# Patient Record
Sex: FEMALE | Race: WHITE | NOT HISPANIC OR LATINO | URBAN - METROPOLITAN AREA
[De-identification: names, ages, dates, MRNs, and addresses within clinical notes are randomized per-mention and may not be internally consistent; named-entity substitution may affect disease eponyms.]

---

## 2017-09-12 ENCOUNTER — EMERGENCY (EMERGENCY)
Facility: HOSPITAL | Age: 82
LOS: 1 days | Discharge: HOME | End: 2017-09-12
Attending: EMERGENCY MEDICINE | Admitting: EMERGENCY MEDICINE
Payer: MEDICARE

## 2017-09-12 VITALS
HEART RATE: 54 BPM | OXYGEN SATURATION: 97 % | RESPIRATION RATE: 18 BRPM | DIASTOLIC BLOOD PRESSURE: 86 MMHG | SYSTOLIC BLOOD PRESSURE: 140 MMHG

## 2017-09-12 PROCEDURE — 99282 EMERGENCY DEPT VISIT SF MDM: CPT

## 2017-09-12 PROCEDURE — 99283 EMERGENCY DEPT VISIT LOW MDM: CPT | Mod: 25

## 2017-09-12 PROCEDURE — 73610 X-RAY EXAM OF ANKLE: CPT | Mod: 26,RT

## 2017-09-12 PROCEDURE — 73610 X-RAY EXAM OF ANKLE: CPT

## 2017-09-12 NOTE — ED ADULT TRIAGE NOTE - CHIEF COMPLAINT QUOTE
R ankle pain, slid out of wheelchair, negative loss of consciousness, negative blood thinners, biba from home

## 2017-09-12 NOTE — ED PROVIDER NOTE - OBJECTIVE STATEMENT
89 y/o F pt  with hx of Diabetes, HTN presents to ED BIBA from home c/o right ankle pain s/p slipping out her wheelchair. No blood thinners. no LOC. denies fever. denies HA or neck pain. no chest pain or sob. no abd pain. no n/v/d. no urinary f/u/d. no back pain. no motor or sensory deficits. denies drug use. no recent travel. no rash. no other acute issues symptoms or concerns

## 2017-09-12 NOTE — ED ADULT NURSE NOTE - OBJECTIVE STATEMENT
89 year old a&ox3 female comes to ED s/o falling out of her wheelchair falling on her rt ankle. pt. denies LOC.

## 2017-09-13 NOTE — ED POST DISCHARGE NOTE - RESULT SUMMARY
+ distal tibia fx- called daughter, NA, left msg and letter sent + distal tibia fx- called daughter and made aware- will call Dr Paniagua to see if he can get her in for cast, if not will return to ED

## 2018-02-01 ENCOUNTER — OUTPATIENT (OUTPATIENT)
Dept: OUTPATIENT SERVICES | Facility: HOSPITAL | Age: 83
LOS: 1 days | End: 2018-02-01
Payer: MEDICAID

## 2018-02-01 PROCEDURE — G9001: CPT

## 2018-02-23 ENCOUNTER — INPATIENT (INPATIENT)
Facility: HOSPITAL | Age: 83
LOS: 4 days | Discharge: HOME | DRG: 292 | End: 2018-02-28
Attending: HOSPITALIST | Admitting: HOSPITALIST
Payer: MEDICARE

## 2018-02-23 VITALS
RESPIRATION RATE: 31 BRPM | OXYGEN SATURATION: 97 % | SYSTOLIC BLOOD PRESSURE: 150 MMHG | DIASTOLIC BLOOD PRESSURE: 67 MMHG | HEART RATE: 75 BPM | TEMPERATURE: 99 F

## 2018-02-23 DIAGNOSIS — R06.00 DYSPNEA, UNSPECIFIED: ICD-10-CM

## 2018-02-23 DIAGNOSIS — R69 ILLNESS, UNSPECIFIED: ICD-10-CM

## 2018-02-23 LAB
ALBUMIN SERPL ELPH-MCNC: 4.2 G/DL — SIGNIFICANT CHANGE UP (ref 3.3–5.2)
ALP SERPL-CCNC: 155 U/L — HIGH (ref 40–120)
ALT FLD-CCNC: 11 U/L — SIGNIFICANT CHANGE UP
ANION GAP SERPL CALC-SCNC: 17 MMOL/L — SIGNIFICANT CHANGE UP (ref 5–17)
APPEARANCE UR: CLEAR — SIGNIFICANT CHANGE UP
APTT BLD: 34.6 SEC — SIGNIFICANT CHANGE UP (ref 27.5–37.4)
AST SERPL-CCNC: 25 U/L — SIGNIFICANT CHANGE UP
BASOPHILS # BLD AUTO: 0 K/UL — SIGNIFICANT CHANGE UP (ref 0–0.2)
BASOPHILS NFR BLD AUTO: 0.1 % — SIGNIFICANT CHANGE UP (ref 0–2)
BILIRUB SERPL-MCNC: 0.3 MG/DL — LOW (ref 0.4–2)
BILIRUB UR-MCNC: NEGATIVE — SIGNIFICANT CHANGE UP
BUN SERPL-MCNC: 12 MG/DL — SIGNIFICANT CHANGE UP (ref 8–20)
CALCIUM SERPL-MCNC: 9.5 MG/DL — SIGNIFICANT CHANGE UP (ref 8.6–10.2)
CHLORIDE SERPL-SCNC: 97 MMOL/L — LOW (ref 98–107)
CO2 SERPL-SCNC: 24 MMOL/L — SIGNIFICANT CHANGE UP (ref 22–29)
COLOR SPEC: YELLOW — SIGNIFICANT CHANGE UP
CREAT SERPL-MCNC: 0.46 MG/DL — LOW (ref 0.5–1.3)
DIFF PNL FLD: ABNORMAL
EOSINOPHIL # BLD AUTO: 0.1 K/UL — SIGNIFICANT CHANGE UP (ref 0–0.5)
EOSINOPHIL NFR BLD AUTO: 0.8 % — SIGNIFICANT CHANGE UP (ref 0–6)
EPI CELLS # UR: SIGNIFICANT CHANGE UP
GLUCOSE BLDC GLUCOMTR-MCNC: 276 MG/DL — HIGH (ref 70–99)
GLUCOSE SERPL-MCNC: 259 MG/DL — HIGH (ref 70–115)
GLUCOSE UR QL: NEGATIVE MG/DL — SIGNIFICANT CHANGE UP
HCT VFR BLD CALC: 38.3 % — SIGNIFICANT CHANGE UP (ref 37–47)
HGB BLD-MCNC: 12.4 G/DL — SIGNIFICANT CHANGE UP (ref 12–16)
INR BLD: 1.21 RATIO — HIGH (ref 0.88–1.16)
KETONES UR-MCNC: NEGATIVE — SIGNIFICANT CHANGE UP
LACTATE SERPL-SCNC: 2.9 MMOL/L — HIGH (ref 0.5–2)
LEUKOCYTE ESTERASE UR-ACNC: NEGATIVE — SIGNIFICANT CHANGE UP
LYMPHOCYTES # BLD AUTO: 19.1 % — LOW (ref 20–55)
LYMPHOCYTES # BLD AUTO: 3.1 K/UL — SIGNIFICANT CHANGE UP (ref 1–4.8)
MCHC RBC-ENTMCNC: 26.2 PG — LOW (ref 27–31)
MCHC RBC-ENTMCNC: 32.4 G/DL — SIGNIFICANT CHANGE UP (ref 32–36)
MCV RBC AUTO: 81 FL — SIGNIFICANT CHANGE UP (ref 81–99)
MONOCYTES # BLD AUTO: 0.6 K/UL — SIGNIFICANT CHANGE UP (ref 0–0.8)
MONOCYTES NFR BLD AUTO: 3.7 % — SIGNIFICANT CHANGE UP (ref 3–10)
NEUTROPHILS # BLD AUTO: 12.3 K/UL — HIGH (ref 1.8–8)
NEUTROPHILS NFR BLD AUTO: 75.6 % — HIGH (ref 37–73)
NITRITE UR-MCNC: NEGATIVE — SIGNIFICANT CHANGE UP
NT-PROBNP SERPL-SCNC: 1550 PG/ML — HIGH (ref 0–300)
PH UR: 6 — SIGNIFICANT CHANGE UP (ref 5–8)
PLATELET # BLD AUTO: 301 K/UL — SIGNIFICANT CHANGE UP (ref 150–400)
POTASSIUM SERPL-MCNC: 4.2 MMOL/L — SIGNIFICANT CHANGE UP (ref 3.5–5.3)
POTASSIUM SERPL-SCNC: 4.2 MMOL/L — SIGNIFICANT CHANGE UP (ref 3.5–5.3)
PROT SERPL-MCNC: 7.9 G/DL — SIGNIFICANT CHANGE UP (ref 6.6–8.7)
PROT UR-MCNC: 100 MG/DL
PROTHROM AB SERPL-ACNC: 13.4 SEC — HIGH (ref 9.8–12.7)
RAPID RVP RESULT: SIGNIFICANT CHANGE UP
RBC # BLD: 4.73 M/UL — SIGNIFICANT CHANGE UP (ref 4.4–5.2)
RBC # FLD: 15.6 % — SIGNIFICANT CHANGE UP (ref 11–15.6)
RBC CASTS # UR COMP ASSIST: SIGNIFICANT CHANGE UP /HPF (ref 0–4)
SODIUM SERPL-SCNC: 138 MMOL/L — SIGNIFICANT CHANGE UP (ref 135–145)
SP GR SPEC: 1.01 — SIGNIFICANT CHANGE UP (ref 1.01–1.02)
TROPONIN T SERPL-MCNC: 0.01 NG/ML — SIGNIFICANT CHANGE UP (ref 0–0.06)
TROPONIN T SERPL-MCNC: 0.02 NG/ML — SIGNIFICANT CHANGE UP (ref 0–0.06)
UROBILINOGEN FLD QL: NEGATIVE MG/DL — SIGNIFICANT CHANGE UP
WBC # BLD: 16.3 K/UL — HIGH (ref 4.8–10.8)
WBC # FLD AUTO: 16.3 K/UL — HIGH (ref 4.8–10.8)
WBC UR QL: SIGNIFICANT CHANGE UP

## 2018-02-23 PROCEDURE — 99223 1ST HOSP IP/OBS HIGH 75: CPT

## 2018-02-23 PROCEDURE — 93010 ELECTROCARDIOGRAM REPORT: CPT

## 2018-02-23 PROCEDURE — 99285 EMERGENCY DEPT VISIT HI MDM: CPT

## 2018-02-23 PROCEDURE — 71045 X-RAY EXAM CHEST 1 VIEW: CPT | Mod: 26

## 2018-02-23 RX ORDER — AZITHROMYCIN 500 MG/1
500 TABLET, FILM COATED ORAL ONCE
Qty: 0 | Refills: 0 | Status: COMPLETED | OUTPATIENT
Start: 2018-02-23 | End: 2018-02-23

## 2018-02-23 RX ORDER — ONDANSETRON 8 MG/1
4 TABLET, FILM COATED ORAL ONCE
Qty: 0 | Refills: 0 | Status: COMPLETED | OUTPATIENT
Start: 2018-02-23 | End: 2018-02-23

## 2018-02-23 RX ORDER — CEFTRIAXONE 500 MG/1
1 INJECTION, POWDER, FOR SOLUTION INTRAMUSCULAR; INTRAVENOUS EVERY 24 HOURS
Qty: 0 | Refills: 0 | Status: DISCONTINUED | OUTPATIENT
Start: 2018-02-23 | End: 2018-02-24

## 2018-02-23 RX ORDER — GLUCAGON INJECTION, SOLUTION 0.5 MG/.1ML
1 INJECTION, SOLUTION SUBCUTANEOUS ONCE
Qty: 0 | Refills: 0 | Status: DISCONTINUED | OUTPATIENT
Start: 2018-02-23 | End: 2018-02-28

## 2018-02-23 RX ORDER — PANTOPRAZOLE SODIUM 20 MG/1
40 TABLET, DELAYED RELEASE ORAL
Qty: 0 | Refills: 0 | Status: DISCONTINUED | OUTPATIENT
Start: 2018-02-23 | End: 2018-02-28

## 2018-02-23 RX ORDER — INSULIN LISPRO 100/ML
VIAL (ML) SUBCUTANEOUS
Qty: 0 | Refills: 0 | Status: DISCONTINUED | OUTPATIENT
Start: 2018-02-23 | End: 2018-02-28

## 2018-02-23 RX ORDER — LISINOPRIL 2.5 MG/1
10 TABLET ORAL DAILY
Qty: 0 | Refills: 0 | Status: DISCONTINUED | OUTPATIENT
Start: 2018-02-23 | End: 2018-02-28

## 2018-02-23 RX ORDER — DEXTROSE 50 % IN WATER 50 %
12.5 SYRINGE (ML) INTRAVENOUS ONCE
Qty: 0 | Refills: 0 | Status: DISCONTINUED | OUTPATIENT
Start: 2018-02-23 | End: 2018-02-28

## 2018-02-23 RX ORDER — GEMFIBROZIL 600 MG
600 TABLET ORAL
Qty: 0 | Refills: 0 | Status: DISCONTINUED | OUTPATIENT
Start: 2018-02-23 | End: 2018-02-28

## 2018-02-23 RX ORDER — IPRATROPIUM/ALBUTEROL SULFATE 18-103MCG
3 AEROSOL WITH ADAPTER (GRAM) INHALATION ONCE
Qty: 0 | Refills: 0 | Status: COMPLETED | OUTPATIENT
Start: 2018-02-23 | End: 2018-02-23

## 2018-02-23 RX ORDER — AMLODIPINE BESYLATE 2.5 MG/1
5 TABLET ORAL
Qty: 0 | Refills: 0 | Status: DISCONTINUED | OUTPATIENT
Start: 2018-02-23 | End: 2018-02-26

## 2018-02-23 RX ORDER — MEMANTINE HYDROCHLORIDE 10 MG/1
5 TABLET ORAL DAILY
Qty: 0 | Refills: 0 | Status: DISCONTINUED | OUTPATIENT
Start: 2018-02-23 | End: 2018-02-28

## 2018-02-23 RX ORDER — DEXTROSE 50 % IN WATER 50 %
25 SYRINGE (ML) INTRAVENOUS ONCE
Qty: 0 | Refills: 0 | Status: DISCONTINUED | OUTPATIENT
Start: 2018-02-23 | End: 2018-02-28

## 2018-02-23 RX ORDER — CEFTRIAXONE 500 MG/1
1 INJECTION, POWDER, FOR SOLUTION INTRAMUSCULAR; INTRAVENOUS ONCE
Qty: 0 | Refills: 0 | Status: COMPLETED | OUTPATIENT
Start: 2018-02-23 | End: 2018-02-23

## 2018-02-23 RX ORDER — AZITHROMYCIN 500 MG/1
500 TABLET, FILM COATED ORAL DAILY
Qty: 0 | Refills: 0 | Status: DISCONTINUED | OUTPATIENT
Start: 2018-02-23 | End: 2018-02-24

## 2018-02-23 RX ORDER — FUROSEMIDE 40 MG
20 TABLET ORAL DAILY
Qty: 0 | Refills: 0 | Status: DISCONTINUED | OUTPATIENT
Start: 2018-02-23 | End: 2018-02-28

## 2018-02-23 RX ORDER — ASPIRIN/CALCIUM CARB/MAGNESIUM 324 MG
81 TABLET ORAL DAILY
Qty: 0 | Refills: 0 | Status: DISCONTINUED | OUTPATIENT
Start: 2018-02-23 | End: 2018-02-28

## 2018-02-23 RX ORDER — FUROSEMIDE 40 MG
40 TABLET ORAL ONCE
Qty: 0 | Refills: 0 | Status: COMPLETED | OUTPATIENT
Start: 2018-02-23 | End: 2018-02-23

## 2018-02-23 RX ORDER — ESCITALOPRAM OXALATE 10 MG/1
20 TABLET, FILM COATED ORAL DAILY
Qty: 0 | Refills: 0 | Status: DISCONTINUED | OUTPATIENT
Start: 2018-02-23 | End: 2018-02-28

## 2018-02-23 RX ORDER — OXYBUTYNIN CHLORIDE 5 MG
5 TABLET ORAL THREE TIMES A DAY
Qty: 0 | Refills: 0 | Status: DISCONTINUED | OUTPATIENT
Start: 2018-02-23 | End: 2018-02-28

## 2018-02-23 RX ORDER — SIMVASTATIN 20 MG/1
10 TABLET, FILM COATED ORAL AT BEDTIME
Qty: 0 | Refills: 0 | Status: DISCONTINUED | OUTPATIENT
Start: 2018-02-23 | End: 2018-02-28

## 2018-02-23 RX ORDER — SODIUM CHLORIDE 9 MG/ML
1000 INJECTION, SOLUTION INTRAVENOUS
Qty: 0 | Refills: 0 | Status: DISCONTINUED | OUTPATIENT
Start: 2018-02-23 | End: 2018-02-28

## 2018-02-23 RX ORDER — DEXTROSE 50 % IN WATER 50 %
1 SYRINGE (ML) INTRAVENOUS ONCE
Qty: 0 | Refills: 0 | Status: DISCONTINUED | OUTPATIENT
Start: 2018-02-23 | End: 2018-02-28

## 2018-02-23 RX ORDER — ENOXAPARIN SODIUM 100 MG/ML
40 INJECTION SUBCUTANEOUS DAILY
Qty: 0 | Refills: 0 | Status: DISCONTINUED | OUTPATIENT
Start: 2018-02-23 | End: 2018-02-28

## 2018-02-23 RX ORDER — SODIUM CHLORIDE 9 MG/ML
3 INJECTION INTRAMUSCULAR; INTRAVENOUS; SUBCUTANEOUS ONCE
Qty: 0 | Refills: 0 | Status: COMPLETED | OUTPATIENT
Start: 2018-02-23 | End: 2018-02-23

## 2018-02-23 RX ORDER — DONEPEZIL HYDROCHLORIDE 10 MG/1
10 TABLET, FILM COATED ORAL AT BEDTIME
Qty: 0 | Refills: 0 | Status: DISCONTINUED | OUTPATIENT
Start: 2018-02-23 | End: 2018-02-28

## 2018-02-23 RX ORDER — METOPROLOL TARTRATE 50 MG
25 TABLET ORAL DAILY
Qty: 0 | Refills: 0 | Status: DISCONTINUED | OUTPATIENT
Start: 2018-02-23 | End: 2018-02-25

## 2018-02-23 RX ADMIN — DONEPEZIL HYDROCHLORIDE 10 MILLIGRAM(S): 10 TABLET, FILM COATED ORAL at 22:59

## 2018-02-23 RX ADMIN — AMLODIPINE BESYLATE 5 MILLIGRAM(S): 2.5 TABLET ORAL at 18:00

## 2018-02-23 RX ADMIN — SIMVASTATIN 10 MILLIGRAM(S): 20 TABLET, FILM COATED ORAL at 22:58

## 2018-02-23 RX ADMIN — CEFTRIAXONE 100 GRAM(S): 500 INJECTION, POWDER, FOR SOLUTION INTRAMUSCULAR; INTRAVENOUS at 13:48

## 2018-02-23 RX ADMIN — ONDANSETRON 4 MILLIGRAM(S): 8 TABLET, FILM COATED ORAL at 12:07

## 2018-02-23 RX ADMIN — Medication 5 MILLIGRAM(S): at 22:58

## 2018-02-23 RX ADMIN — SODIUM CHLORIDE 3 MILLILITER(S): 9 INJECTION INTRAMUSCULAR; INTRAVENOUS; SUBCUTANEOUS at 12:19

## 2018-02-23 RX ADMIN — AZITHROMYCIN 255 MILLIGRAM(S): 500 TABLET, FILM COATED ORAL at 13:48

## 2018-02-23 RX ADMIN — Medication 3 MILLILITER(S): at 12:12

## 2018-02-23 RX ADMIN — Medication 6: at 16:56

## 2018-02-23 RX ADMIN — Medication 125 MILLIGRAM(S): at 12:12

## 2018-02-23 RX ADMIN — Medication 40 MILLIGRAM(S): at 13:04

## 2018-02-23 NOTE — CONSULT NOTE ADULT - SUBJECTIVE AND OBJECTIVE BOX
HPI:  89 yr old woman with history of DM, htn, Dementia, LBBB presenting with SOB x 2 days. She reports relatively acute onset of SOB at rest. At baseline she uses a wheelchair for ambulation, and lives at home with HHA. She denies fevers, chills, palpitation, lightheadedness, syncope, chest pain. After arrival in ED given lasix and she is feeling better.   She denies cardiac history. In  she had a hip replacement, and had cardiac catheterization at that time revealed nonobstructive CAD. TTE then revealed mild calcific AS with preserved LV function.   CXR on arrival revealed bilateral pulmonary edema. ECG with SR and LBBB.   On interview pt comfortable on room air.        PAST MEDICAL & SURGICAL HISTORY:  Overactive bladder  Dementia  High cholesterol  HTN (hypertension)  DM (diabetes mellitus)      REVIEW OF SYSTEMS  denies fevers, chills, LH, CP, syncope, LE edema. no sick contacts. Otherwise negative other than HPI.     MEDICATIONS  (STANDING):  amLODIPine   Tablet 5 milliGRAM(s) Oral two times a day  aspirin  chewable 81 milliGRAM(s) Oral daily  azithromycin   Tablet 500 milliGRAM(s) Oral daily  cefTRIAXone   IVPB 1 Gram(s) IV Intermittent every 24 hours  dextrose 5%. 1000 milliLiter(s) (50 mL/Hr) IV Continuous <Continuous>  dextrose 50% Injectable 12.5 Gram(s) IV Push once  dextrose 50% Injectable 25 Gram(s) IV Push once  dextrose 50% Injectable 25 Gram(s) IV Push once  donepezil 10 milliGRAM(s) Oral at bedtime  enoxaparin Injectable 40 milliGRAM(s) SubCutaneous daily  escitalopram 20 milliGRAM(s) Oral daily  gemfibrozil 600 milliGRAM(s) Oral two times a day before meals  insulin lispro (HumaLOG) corrective regimen sliding scale   SubCutaneous three times a day before meals  lisinopril 10 milliGRAM(s) Oral daily  memantine 5 milliGRAM(s) Oral daily  metoprolol     tartrate 25 milliGRAM(s) Oral daily  oxybutynin 5 milliGRAM(s) Oral three times a day  pantoprazole    Tablet 40 milliGRAM(s) Oral before breakfast  simvastatin 10 milliGRAM(s) Oral at bedtime    MEDICATIONS  (PRN):  dextrose Gel 1 Dose(s) Oral once PRN Blood Glucose LESS THAN 70 milliGRAM(s)/deciliter  glucagon  Injectable 1 milliGRAM(s) IntraMuscular once PRN Glucose LESS THAN 70 milligrams/deciliter      Allergies    No Known Allergies    Intolerances      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    FAMILY HISTORY:  No pertinent family history in first degree relatives      Vital Signs Last 24 Hrs  T(C): 37 (2018 12:00), Max: 37 (2018 12:00)  T(F): 98.6 (2018 12:00), Max: 98.6 (2018 12:00)  HR: 71 (2018 14:33) (71 - 75)  BP: 164/70 (2018 14:33) (150/67 - 164/70)  BP(mean): --  RR: 18 (2018 14:33) (18 - 31)  SpO2: 96% (2018 14:33) (96% - 97%)    PHYSICAL EXAM:  NAD  No JVD  scattered rales  RRR with HSM apex II/VI  s/nt/nd  no edema, warm  slightly confused  no rash  gait not assessed      LABS:                        12.4   16.3  )-----------( 301      ( 2018 12:29 )             38.3   02-23    138  |  97<L>  |  12.0  ----------------------------<  259<H>  4.2   |  24.0  |  0.46<L>    Ca    9.5      2018 12:29    TPro  7.9  /  Alb  4.2  /  TBili  0.3<L>  /  DBili  x   /  AST  25  /  ALT  11  /  AlkPhos  155<H>  02-23  LIVER FUNCTIONS - ( 2018 12:29 )  Alb: 4.2 g/dL / Pro: 7.9 g/dL / ALK PHOS: 155 U/L / ALT: 11 U/L / AST: 25 U/L / GGT: x           PT/INR - ( 2018 12:29 )   PT: 13.4 sec;   INR: 1.21 ratio         PTT - ( 2018 12:29 )  PTT:34.6 secCARDIAC MARKERS ( 2018 12:29 )  x     / 0.01 ng/mL / x     / x     / x        trop 0.01  BNP 1550  Lactate 2.6    Urinalysis Basic - ( 2018 14:47 )    Color: Yellow / Appearance: Clear / S.010 / pH: x  Gluc: x / Ketone: Negative  / Bili: Negative / Urobili: Negative mg/dL   Blood: x / Protein: 100 mg/dL / Nitrite: Negative   Leuk Esterase: Negative / RBC: 0-2 /HPF / WBC 0-2   Sq Epi: x / Non Sq Epi: Occasional / Bacteria: x        RADIOLOGY & ADDITIONAL STUDIES:  < from: Xray Chest 1 View AP/PA. (18 @ 12:30) >  : Diffuse increased bilateral interstitial densities in both   lungs.    < end of copied text >    ECG sinus rhythm with LBBB

## 2018-02-23 NOTE — ED PROVIDER NOTE - CARE PLAN
Principal Discharge DX:	Dyspnea Principal Discharge DX:	Dyspnea  Secondary Diagnosis:	Congestive heart failure

## 2018-02-23 NOTE — ED ADULT NURSE REASSESSMENT NOTE - NS ED NURSE REASSESS COMMENT FT1
Pt resting comfortably, with VSS and family at bedside, denies c/o pain, no acute s/s of respiratory distress noted or reported, report given to [facility] RN MM

## 2018-02-23 NOTE — ED PROVIDER NOTE - PROGRESS NOTE DETAILS
Will hold off fluids due to possibility of acute pulmonary edema presentation and elevated bnp. But due to leukocytoisis and cough for couple of days will give antibiotics.

## 2018-02-23 NOTE — H&P ADULT - NSHPPHYSICALEXAM_GEN_ALL_CORE
GENERAL: NAD, well-groomed, well-developed, elderly female [place], looks younger than her stated age   HEAD:  Atraumatic, Normocephalic  EYES: EOMI,  conjunctiva and sclera clear  ENMT: No tonsillar erythema, exudates, or enlargement; Moist mucous membranes, Good dentition, No lesions  NECK: Supple, No JVD, Normal thyroid  NERVOUS SYSTEM:  Alert & Oriented X3, Good concentration; Moves in bed B/L upper and weak lower extremities;   CHEST/LUNG: Clear to percussion bilaterally; positive crepts   HEART: Regular rate and rhythm; No murmurs, rubs, or gallops  ABDOMEN: Soft, Nontender, Nondistended; Bowel sounds present  EXTREMITIES:  2+ Peripheral Pulses, No clubbing, cyanosis, or edema  LYMPH: No lymphadenopathy noted  SKIN: No rashes or lesions

## 2018-02-23 NOTE — CONSULT NOTE ADULT - ASSESSMENT
89F with HTN, DM, dementia, LBBB p/w acute SOB with CXR consistent with pulmonary edema. BNP elevated, and findings consistent with CHF. In 2008 she had preserved LV function with mild AS- exam today consistent with MR. I suspect CHF exacerbation in setting of valvular heart disease and likely diastolic dysfunction. Pt also being treated for possible pna/sepsis.   -would continue gentle diuresis. lasix 20mg IV QD  -continue metoprolol and lisinopril for BP management  -TTE to assess valvular disease.   -pt likely declining invasive procedures. discussed in detail with pt and her daughter. Decisions to be made based on findings of workup.   -one more set of cardiac enzymes  -telemetry  -Monitor I/Os.

## 2018-02-23 NOTE — ED ADULT NURSE NOTE - OBJECTIVE STATEMENT
Assumed pt care at time of arrival by EMS.  Pt presenting as tachypneic and reported to have O2sat of 73% prior to arrival.  Pt a&ox3, reporting that she started to have trouble breathing yesterday, which became increasingly worse today while watching TV at home. Pt vomitting upon arrival, denies any chest pain at this time, will continue to monitor

## 2018-02-23 NOTE — ED PROVIDER NOTE - CONSTITUTIONAL, MLM
normal... Well appearing, well nourished, awake, alert, oriented to person, place, time/situation and speaking in full sentences awake alert anxious.

## 2018-02-23 NOTE — H&P ADULT - HISTORY OF PRESENT ILLNESS
89 yr olf female with known DM, htn, Dementia BIBEMS for SOB given nitrate and CPAP, SOB worsening over last couple days , positive associated with cough, no fever , no phlegm , no chills, no CP, no palpitations 89 yr olf female with known DM, htn, Dementia BIBEMS for SOB given nitrate and CPAP, SOB worsening over last couple days , positive associated with cough, no fever , no phlegm , no chills, no CP, no palpitations , denied edema, denied orthopnea, hasn't seen a cardiologist anita really long time , hasn't been around sick contacts, lives alone at home with aids to help, she has week legs and is essentially wheel chair bound, denied dysuria or skin break or diarrhea

## 2018-02-23 NOTE — ED PROVIDER NOTE - OBJECTIVE STATEMENT
89 y/o F pt BIBA with hx of DM, HTN, and HLD presents to ED c/o SOB that onset suddenly today while watching TV. Pt notes she did have some difficulty breathing yesterday that resolved on its own. She also notes nausea and vomiting. Per EMS, pt was in respiratory distress when they arrived on scene, her O2 Sat was low 70's. They put Cpap on, gave 3 Nitro and 4 of Zofran. Nonsmoker Denies fever and CP. No further complaints at this time.

## 2018-02-23 NOTE — H&P ADULT - ASSESSMENT
89 yr old female with CHF- cardio called cautious diuresis with elevated lactate, Cardio Dr castaneda, echo, rule out ACS- troponin, aspirin, BB, statin   leukocytosis- possible pneumonia stated Rocephin Zithromax follow cultures   DM- HISS  htn - above , Norvasc   DVT PPX- Lovenox  GERD- Protonix   Mood disorder- cont home meds   OAB- oxybutynin

## 2018-02-24 DIAGNOSIS — D72.823 LEUKEMOID REACTION: ICD-10-CM

## 2018-02-24 DIAGNOSIS — R91.8 OTHER NONSPECIFIC ABNORMAL FINDING OF LUNG FIELD: ICD-10-CM

## 2018-02-24 DIAGNOSIS — R06.02 SHORTNESS OF BREATH: ICD-10-CM

## 2018-02-24 LAB
ALBUMIN SERPL ELPH-MCNC: 3.7 G/DL — SIGNIFICANT CHANGE UP (ref 3.3–5.2)
ALP SERPL-CCNC: 122 U/L — HIGH (ref 40–120)
ALT FLD-CCNC: 11 U/L — SIGNIFICANT CHANGE UP
ANION GAP SERPL CALC-SCNC: 15 MMOL/L — SIGNIFICANT CHANGE UP (ref 5–17)
ANISOCYTOSIS BLD QL: SLIGHT — SIGNIFICANT CHANGE UP
APPEARANCE UR: CLEAR — SIGNIFICANT CHANGE UP
AST SERPL-CCNC: 25 U/L — SIGNIFICANT CHANGE UP
BASOPHILS # BLD AUTO: 0 K/UL — SIGNIFICANT CHANGE UP (ref 0–0.2)
BASOPHILS NFR BLD AUTO: 0.1 % — SIGNIFICANT CHANGE UP (ref 0–2)
BILIRUB SERPL-MCNC: 0.2 MG/DL — LOW (ref 0.4–2)
BILIRUB UR-MCNC: NEGATIVE — SIGNIFICANT CHANGE UP
BUN SERPL-MCNC: 25 MG/DL — HIGH (ref 8–20)
CALCIUM SERPL-MCNC: 9.1 MG/DL — SIGNIFICANT CHANGE UP (ref 8.6–10.2)
CHLORIDE SERPL-SCNC: 94 MMOL/L — LOW (ref 98–107)
CO2 SERPL-SCNC: 25 MMOL/L — SIGNIFICANT CHANGE UP (ref 22–29)
COLOR SPEC: YELLOW — SIGNIFICANT CHANGE UP
CREAT SERPL-MCNC: 0.7 MG/DL — SIGNIFICANT CHANGE UP (ref 0.5–1.3)
CULTURE RESULTS: SIGNIFICANT CHANGE UP
DIFF PNL FLD: NEGATIVE — SIGNIFICANT CHANGE UP
EOSINOPHIL # BLD AUTO: 0 K/UL — SIGNIFICANT CHANGE UP (ref 0–0.5)
EOSINOPHIL NFR BLD AUTO: 0.3 % — SIGNIFICANT CHANGE UP (ref 0–6)
EPI CELLS # UR: SIGNIFICANT CHANGE UP
GLUCOSE BLDC GLUCOMTR-MCNC: 133 MG/DL — HIGH (ref 70–99)
GLUCOSE BLDC GLUCOMTR-MCNC: 150 MG/DL — HIGH (ref 70–99)
GLUCOSE BLDC GLUCOMTR-MCNC: 182 MG/DL — HIGH (ref 70–99)
GLUCOSE BLDC GLUCOMTR-MCNC: 193 MG/DL — HIGH (ref 70–99)
GLUCOSE SERPL-MCNC: 195 MG/DL — HIGH (ref 70–115)
GLUCOSE UR QL: NEGATIVE MG/DL — SIGNIFICANT CHANGE UP
HBA1C BLD-MCNC: 6.8 % — HIGH (ref 4–5.6)
HCT VFR BLD CALC: 34.2 % — LOW (ref 37–47)
HGB BLD-MCNC: 10.7 G/DL — LOW (ref 12–16)
HYPOCHROMIA BLD QL: SLIGHT — SIGNIFICANT CHANGE UP
KETONES UR-MCNC: NEGATIVE — SIGNIFICANT CHANGE UP
LEUKOCYTE ESTERASE UR-ACNC: NEGATIVE — SIGNIFICANT CHANGE UP
LYMPHOCYTES # BLD AUTO: 1.6 K/UL — SIGNIFICANT CHANGE UP (ref 1–4.8)
LYMPHOCYTES # BLD AUTO: 11.1 % — LOW (ref 20–55)
MCHC RBC-ENTMCNC: 25.2 PG — LOW (ref 27–31)
MCHC RBC-ENTMCNC: 31.3 G/DL — LOW (ref 32–36)
MCV RBC AUTO: 80.5 FL — LOW (ref 81–99)
MICROCYTES BLD QL: SLIGHT — SIGNIFICANT CHANGE UP
MONOCYTES # BLD AUTO: 0.7 K/UL — SIGNIFICANT CHANGE UP (ref 0–0.8)
MONOCYTES NFR BLD AUTO: 5.1 % — SIGNIFICANT CHANGE UP (ref 3–10)
NEUTROPHILS # BLD AUTO: 11.6 K/UL — HIGH (ref 1.8–8)
NEUTROPHILS NFR BLD AUTO: 82.9 % — HIGH (ref 37–73)
NITRITE UR-MCNC: NEGATIVE — SIGNIFICANT CHANGE UP
PH UR: 6 — SIGNIFICANT CHANGE UP (ref 5–8)
PLAT MORPH BLD: NORMAL — SIGNIFICANT CHANGE UP
PLATELET # BLD AUTO: 299 K/UL — SIGNIFICANT CHANGE UP (ref 150–400)
POTASSIUM SERPL-MCNC: 4.5 MMOL/L — SIGNIFICANT CHANGE UP (ref 3.5–5.3)
POTASSIUM SERPL-SCNC: 4.5 MMOL/L — SIGNIFICANT CHANGE UP (ref 3.5–5.3)
PROT SERPL-MCNC: 7.3 G/DL — SIGNIFICANT CHANGE UP (ref 6.6–8.7)
PROT UR-MCNC: 15 MG/DL
RBC # BLD: 4.25 M/UL — LOW (ref 4.4–5.2)
RBC # FLD: 15.6 % — SIGNIFICANT CHANGE UP (ref 11–15.6)
RBC BLD AUTO: ABNORMAL
SODIUM SERPL-SCNC: 134 MMOL/L — LOW (ref 135–145)
SP GR SPEC: 1.01 — SIGNIFICANT CHANGE UP (ref 1.01–1.02)
SPECIMEN SOURCE: SIGNIFICANT CHANGE UP
TROPONIN T SERPL-MCNC: 0.01 NG/ML — SIGNIFICANT CHANGE UP (ref 0–0.06)
UROBILINOGEN FLD QL: NEGATIVE MG/DL — SIGNIFICANT CHANGE UP
WBC # BLD: 14 K/UL — HIGH (ref 4.8–10.8)
WBC # FLD AUTO: 14 K/UL — HIGH (ref 4.8–10.8)
WBC UR QL: SIGNIFICANT CHANGE UP

## 2018-02-24 PROCEDURE — 99233 SBSQ HOSP IP/OBS HIGH 50: CPT

## 2018-02-24 PROCEDURE — 93306 TTE W/DOPPLER COMPLETE: CPT | Mod: 26

## 2018-02-24 PROCEDURE — 99222 1ST HOSP IP/OBS MODERATE 55: CPT

## 2018-02-24 RX ADMIN — Medication 2: at 18:25

## 2018-02-24 RX ADMIN — ESCITALOPRAM OXALATE 20 MILLIGRAM(S): 10 TABLET, FILM COATED ORAL at 13:08

## 2018-02-24 RX ADMIN — Medication 81 MILLIGRAM(S): at 13:07

## 2018-02-24 RX ADMIN — Medication 2: at 08:34

## 2018-02-24 RX ADMIN — Medication 20 MILLIGRAM(S): at 06:18

## 2018-02-24 RX ADMIN — CEFTRIAXONE 100 GRAM(S): 500 INJECTION, POWDER, FOR SOLUTION INTRAMUSCULAR; INTRAVENOUS at 13:08

## 2018-02-24 RX ADMIN — Medication 5 MILLIGRAM(S): at 22:21

## 2018-02-24 RX ADMIN — AMLODIPINE BESYLATE 5 MILLIGRAM(S): 2.5 TABLET ORAL at 06:18

## 2018-02-24 RX ADMIN — Medication 5 MILLIGRAM(S): at 06:19

## 2018-02-24 RX ADMIN — MEMANTINE HYDROCHLORIDE 5 MILLIGRAM(S): 10 TABLET ORAL at 13:07

## 2018-02-24 RX ADMIN — LISINOPRIL 10 MILLIGRAM(S): 2.5 TABLET ORAL at 06:18

## 2018-02-24 RX ADMIN — AMLODIPINE BESYLATE 5 MILLIGRAM(S): 2.5 TABLET ORAL at 18:24

## 2018-02-24 RX ADMIN — Medication 25 MILLIGRAM(S): at 06:18

## 2018-02-24 RX ADMIN — ENOXAPARIN SODIUM 40 MILLIGRAM(S): 100 INJECTION SUBCUTANEOUS at 22:21

## 2018-02-24 RX ADMIN — Medication 5 MILLIGRAM(S): at 13:09

## 2018-02-24 RX ADMIN — DONEPEZIL HYDROCHLORIDE 10 MILLIGRAM(S): 10 TABLET, FILM COATED ORAL at 22:21

## 2018-02-24 RX ADMIN — SIMVASTATIN 10 MILLIGRAM(S): 20 TABLET, FILM COATED ORAL at 22:21

## 2018-02-24 RX ADMIN — AZITHROMYCIN 500 MILLIGRAM(S): 500 TABLET, FILM COATED ORAL at 13:09

## 2018-02-24 RX ADMIN — PANTOPRAZOLE SODIUM 40 MILLIGRAM(S): 20 TABLET, DELAYED RELEASE ORAL at 06:18

## 2018-02-24 RX ADMIN — Medication 600 MILLIGRAM(S): at 18:24

## 2018-02-24 NOTE — CONSULT NOTE ADULT - PROBLEM SELECTOR RECOMMENDATION 9
possible heart failure  - workup in process  - will possible heart failure  - workup in process  - will stop all antibiotics and watch

## 2018-02-24 NOTE — PROGRESS NOTE ADULT - ASSESSMENT
89F with HTN, DM, dementia, LBBB p/w acute SOB with CXR consistent with pulmonary edema. BNP elevated, and findings consistent with CHF. I suspect CHF exacerbation in setting of valvular heart disease and likely diastolic dysfunction. Symptomatically improved with diuresis  -f/u TTE. prelim concerning for AS and MS/MR. Once severity of valvular disease quantified will discuss with pt management options, though she initially reported hesitancy with invasive procedures  -would continue gentle diuresis. lasix 20mg IV QD. Likely transition to PO tomorrow if continues to feel well  -continue metoprolol and lisinopril for BP management  -will need to have code status and goals of care discussion.  -telemetry  -Monitor I/Os.

## 2018-02-24 NOTE — CONSULT NOTE ADULT - ASSESSMENT
This 89 y.o. F here for SOB. History and clinical exam favors This 89 y.o. F here for SOB. History and clinical exam favors heart disease and or heart failure.

## 2018-02-24 NOTE — CONSULT NOTE ADULT - SUBJECTIVE AND OBJECTIVE BOX
[facility]FECTI[facility] DISEASES AND INTERNAL MEDICINE at [place]  =======================================================  Alfonso Arias MD FACP   Ajith Bonds MD  Diplomates American Board of Internal Medicine and Infectious Diseases  =======================================================    [id number]  SHARNO HARDING is a 89y  Female   This 89 y.o. F with DM, htn, Dementia BIBEMS for SOB given nitrate and CPAP, SOB worsening over last couple days , positive associated with cough, no fever , no phlegm , no chills, no CP, no palpitations , denied edema, denied orthopnea, hasn't seen a cardiologist anita really long time , hasn't been around sick contacts, lives alone at home with aids to help, she has week legs and is essentially wheel chair bound, denied dysuria or skin break or diarrhea.    Workup here negative UA x 2 sets, Urine cx negative.  has RVP negative.  Patient started empirically on Azithromycin and Ceftriaxone.  Pulm interstitial infiltrates bilaterally on CXR.       =======================================================  Past Medical & Surgical Hx:  =====================  PAST MEDICAL & SURGICAL HISTORY:  Overactive bladder  Dementia  High cholesterol  HTN (hypertension)  DM (diabetes mellitus)      Problem List:  ==========  HEALTH ISSUES - PROBLEM Dx:      Social Hx:  =======  no toxic habits currently      FAMILY HISTORY:  No pertinent family history in first degree relatives  no family history of immunosuppressive disorders    Allergies  No Known Allergies  Intolerances        MEDICATIONS  (STANDING):  amLODIPine   Tablet 5 milliGRAM(s) Oral two times a day  aspirin  chewable 81 milliGRAM(s) Oral daily  azithromycin   Tablet 500 milliGRAM(s) Oral daily  cefTRIAXone   IVPB 1 Gram(s) IV Intermittent every 24 hours  dextrose 5%. 1000 milliLiter(s) (50 mL/Hr) IV Continuous <Continuous>  dextrose 50% Injectable 12.5 Gram(s) IV Push once  dextrose 50% Injectable 25 Gram(s) IV Push once  dextrose 50% Injectable 25 Gram(s) IV Push once  donepezil 10 milliGRAM(s) Oral at bedtime  enoxaparin Injectable 40 milliGRAM(s) SubCutaneous daily  escitalopram 20 milliGRAM(s) Oral daily  furosemide   Injectable 20 milliGRAM(s) IV Push daily  gemfibrozil 600 milliGRAM(s) Oral two times a day before meals  insulin lispro (HumaLOG) corrective regimen sliding scale   SubCutaneous three times a day before meals  lisinopril 10 milliGRAM(s) Oral daily  memantine 5 milliGRAM(s) Oral daily  metoprolol     tartrate 25 milliGRAM(s) Oral daily  oxybutynin 5 milliGRAM(s) Oral three times a day  pantoprazole    Tablet 40 milliGRAM(s) Oral before breakfast  simvastatin 10 milliGRAM(s) Oral at bedtime      =======================================================  REVIEW OF SYSTEMS:  limited due to medical condition    =======================================================    Physical Exam:  ============  Vital Signs Last 24 Hrs  T(C): 37.1 (2018 10:45), Max: 37.1 (2018 00:35)  T(F): 98.7 (2018 10:45), Max: 98.7 (2018 00:35)  HR: 77 (2018 10:45) (68 - 77)  BP: 119/67 (2018 10:45) (119/67 - 164/70)  RR: 19 (2018 10:45) (18 - 19)  SpO2: 95% (2018 06:14) (93% - 97%)    General:  No acute distress.  Eye: Pupils are equal, round and reactive to light, Extraocular movements are intact, Normal conjunctiva.  HENT: Normocephalic, Oral mucosa is moist, No pharyngeal erythema, No sinus tenderness.  Neck: Supple, No lymphadenopathy.  Respiratory: Lungs are clear to auscultation, Respirations are non-labored.  Cardiovascular: Normal rate, Regular rhythm, No murmur, Good pulses equal in all extremities, No edema.  Gastrointestinal: Soft, Non-tender, Non-distended, Normal bowel sounds.  Genitourinary: No costovertebral angle tenderness.  Lymphatics: No lymphadenopathy neck, axilla, groin.  Musculoskeletal: Normal range of motion, Normal strength.  Integumentary: No rash.  Neurologic: No focal deficits, Cranial Nerves II-XII are grossly intact.  Psychiatric: Appropriate mood & affect.      =======================================================  Labs:  ====    Labs:      138  |  97<L>  |  12.0  ----------------------------<  259<H>  4.2   |  24.0  |  0.46<L>    Ca    9.5      2018 12:29    TPro  7.9  /  Alb  4.2  /  TBili  0.3<L>  /  DBili  x   /  AST  25  /  ALT  11  /  AlkPhos  155<H>                            12.4   16.3  )-----------( 301      ( 2018 12:29 )             38.3       PT/INR - ( 2018 12:29 )   PT: 13.4 sec;   INR: 1.21 ratio         PTT - ( 2018 12:29 )  PTT:34.6 sec  Urinalysis Basic - ( 2018 09:13 )    Color: Yellow / Appearance: Clear / S.015 / pH: x  Gluc: x / Ketone: Negative  / Bili: Negative / Urobili: Negative mg/dL   Blood: x / Protein: 15 mg/dL / Nitrite: Negative   Leuk Esterase: Negative / RBC: x / WBC 3-5   Sq Epi: x / Non Sq Epi: Occasional / Bacteria: x      LIVER FUNCTIONS - ( 2018 12:29 )  Alb: 4.2 g/dL / Pro: 7.9 g/dL / ALK PHOS: 155 U/L / ALT: 11 U/L / AST: 25 U/L / GGT: x           CARDIAC MARKERS ( 2018 07:27 )  x     / 0.01 ng/mL / x     / x     / x      CARDIAC MARKERS ( 2018 19:24 )  x     / 0.02 ng/mL / x     / x     / x      CARDIAC MARKERS ( 2018 12:29 )  x     / 0.01 ng/mL / x     / x     / x          CAPILLARY BLOOD GLUCOSE      POCT Blood Glucose.: 150 mg/dL (2018 13:05)  POCT Blood Glucose.: 193 mg/dL (2018 08:33)  POCT Blood Glucose.: 276 mg/dL (2018 16:55)        RECENT CULTURES:   @ 14:47 .Urine Catheterized     <10,000 CFU/ml Gram Positive Rods  <10,000 CFU/ml Gram Positive Cocci in Pairs and Chains         @ 12:47      [facility]  [facility]FECTIOUS DISEASES [facility] INTERNAL MEDICINE at [place]  =======================================================  Alfonso Arias MD FACP   Ajith Bonds MD  Diplomates American Board of Internal Medicine and Infectious Diseases  =======================================================    [id number]  SHARON HARDING is a 89y  Female   This 89 y.o. F with DM, htn, Dementia BIBEMS for SOB given nitrate and CPAP, SOB worsening over last couple days , positive associated with cough, no fever , no phlegm , no chills, no CP, no palpitations , denied edema, denied orthopnea, hasn't seen a cardiologist anita really long time , hasn't been around sick contacts, lives alone at home with aids to help, she has week legs and is essentially wheel chair bound, denied dysuria or skin break or diarrhea.    Workup here negative UA x 2 sets, Urine cx negative.  has RVP negative.  Patient started empirically on Azithromycin and Ceftriaxone.  Pulm interstitial infiltrates bilaterally on CXR.       =======================================================  Past Medical & Surgical Hx:  =====================  PAST MEDICAL & SURGICAL HISTORY:  Overactive bladder  Dementia  High cholesterol  HTN (hypertension)  DM (diabetes mellitus)      Problem List:  ==========  HEALTH ISSUES - PROBLEM Dx:      Social Hx:  =======  no toxic habits currently      FAMILY HISTORY:  No pertinent family history in first degree relatives  brother and mother with CAD    Allergies  No Known Allergies  Intolerances      MEDICATIONS  (STANDING):  amLODIPine   Tablet 5 milliGRAM(s) Oral two times a day  aspirin  chewable 81 milliGRAM(s) Oral daily  azithromycin   Tablet 500 milliGRAM(s) Oral daily  cefTRIAXone   IVPB 1 Gram(s) IV Intermittent every 24 hours  dextrose 5%. 1000 milliLiter(s) (50 mL/Hr) IV Continuous <Continuous>  dextrose 50% Injectable 12.5 Gram(s) IV Push once  dextrose 50% Injectable 25 Gram(s) IV Push once  dextrose 50% Injectable 25 Gram(s) IV Push once  donepezil 10 milliGRAM(s) Oral at bedtime  enoxaparin Injectable 40 milliGRAM(s) SubCutaneous daily  escitalopram 20 milliGRAM(s) Oral daily  furosemide   Injectable 20 milliGRAM(s) IV Push daily  gemfibrozil 600 milliGRAM(s) Oral two times a day before meals  insulin lispro (HumaLOG) corrective regimen sliding scale   SubCutaneous three times a day before meals  lisinopril 10 milliGRAM(s) Oral daily  memantine 5 milliGRAM(s) Oral daily  metoprolol     tartrate 25 milliGRAM(s) Oral daily  oxybutynin 5 milliGRAM(s) Oral three times a day  pantoprazole    Tablet 40 milliGRAM(s) Oral before breakfast  simvastatin 10 milliGRAM(s) Oral at bedtime      =======================================================  REVIEW OF SYSTEMS:  REVIEW OF SYSTEMS:  CONSTITUTIONAL:  as per HPI  HEENT:  Eyes:  No diplopia or blurred vision. ENT:  No earache, sore throat or runny nose.  CARDIOVASCULAR:  No pressure, squeezing, strangling, tightness, heaviness or aching about the chest, neck, axilla or epigastrium.  RESPIRATORY:  ad above  GASTROINTESTINAL:  No nausea, vomiting or diarrhea.  GENITOURINARY:  No dysuria, frequency or urgency. No Blood in urine  MUSCULOSKELETAL:  no joint aches, no muscle pain  SKIN:  No change in skin, hair or nails.  NEUROLOGIC:  No paresthesias, fasciculations, seizures or weakness.  PSYCHIATRIC:  No disorder of thought or mood.  ENDOCRINE:  No heat or cold intolerance, polyuria or polydipsia.  HEMATOLOGICAL:  No easy bruising or bleeding.     =======================================================    Physical Exam:  ============  Vital Signs Last 24 Hrs  T(C): 37.1 (2018 10:45), Max: 37.1 (2018 00:35)  T(F): 98.7 (2018 10:45), Max: 98.7 (2018 00:35)  HR: 77 (2018 10:45) (68 - 77)  BP: 119/67 (2018 10:45) (119/67 - 164/70)  RR: 19 (2018 10:45) (18 - 19)  SpO2: 95% (2018 06:14) (93% - 97%)    General:  No acute distress. sitting in chair  Eye: Pupils are equal, round and reactive to light, Extraocular movements are intact, Normal conjunctiva.  HENT: Normocephalic, Oral mucosa is moist, No pharyngeal erythema, No sinus tenderness.  Neck: Supple, No lymphadenopathy.  Respiratory: Rales bilaterally. Respirations are non-labored.  Cardiovascular: Normal rate, Regular rhythm, No murmur, Good pulses equal in all extremities, No edema.  Gastrointestinal: Soft, Non-tender, Non-distended, Normal bowel sounds.  Genitourinary: No costovertebral angle tenderness.  Lymphatics: No lymphadenopathy neck, axilla, groin.  Musculoskeletal: Normal range of motion, Normal strength.  Integumentary: No rash.  Neurologic: No focal deficits, Cranial Nerves II-XII are grossly intact.  Psychiatric: Appropriate mood & affect.      =======================================================  Labs:  ====    Labs:      138  |  97<L>  |  12.0  ----------------------------<  259<H>  4.2   |  24.0  |  0.46<L>    Ca    9.5      2018 12:29    TPro  7.9  /  Alb  4.2  /  TBili  0.3<L>  /  DBili  x   /  AST  25  /  ALT  11  /  AlkPhos  155<H>                            12.4   16.3  )-----------( 301      ( 2018 12:29 )             38.3       PT/INR - ( 2018 12:29 )   PT: 13.4 sec;   INR: 1.21 ratio         PTT - ( 2018 12:29 )  PTT:34.6 sec  Urinalysis Basic - ( 2018 09:13 )    Color: Yellow / Appearance: Clear / S.015 / pH: x  Gluc: x / Ketone: Negative  / Bili: Negative / Urobili: Negative mg/dL   Blood: x / Protein: 15 mg/dL / Nitrite: Negative   Leuk Esterase: Negative / RBC: x / WBC 3-5   Sq Epi: x / Non Sq Epi: Occasional / Bacteria: x      LIVER FUNCTIONS - ( 2018 12:29 )  Alb: 4.2 g/dL / Pro: 7.9 g/dL / ALK PHOS: 155 U/L / ALT: 11 U/L / AST: 25 U/L / GGT: x           CARDIAC MARKERS ( 2018 07:27 )  x     / 0.01 ng/mL / x     / x     / x      CARDIAC MARKERS ( 2018 19:24 )  x     / 0.02 ng/mL / x     / x     / x      CARDIAC MARKERS ( 2018 12:29 )  x     / 0.01 ng/mL / x     / x     / x          CAPILLARY BLOOD GLUCOSE      POCT Blood Glucose.: 150 mg/dL (2018 13:05)  POCT Blood Glucose.: 193 mg/dL (2018 08:33)  POCT Blood Glucose.: 276 mg/dL (2018 16:55)        RECENT CULTURES:   @ 14:47 .Urine Catheterized     <10,000 CFU/ml Gram Positive Rods  <10,000 CFU/ml Gram Positive Cocci in Pairs and Chains         @ 12:47      [facility]

## 2018-02-24 NOTE — PROGRESS NOTE ADULT - SUBJECTIVE AND OBJECTIVE BOX
Pt feeling well in NAD. denies dyspnea today  TTE in progress    MEDICATIONS  (STANDING):  amLODIPine   Tablet 5 milliGRAM(s) Oral two times a day  aspirin  chewable 81 milliGRAM(s) Oral daily  azithromycin   Tablet 500 milliGRAM(s) Oral daily  cefTRIAXone   IVPB 1 Gram(s) IV Intermittent every 24 hours  dextrose 5%. 1000 milliLiter(s) (50 mL/Hr) IV Continuous <Continuous>  dextrose 50% Injectable 12.5 Gram(s) IV Push once  dextrose 50% Injectable 25 Gram(s) IV Push once  dextrose 50% Injectable 25 Gram(s) IV Push once  donepezil 10 milliGRAM(s) Oral at bedtime  enoxaparin Injectable 40 milliGRAM(s) SubCutaneous daily  escitalopram 20 milliGRAM(s) Oral daily  furosemide   Injectable 20 milliGRAM(s) IV Push daily  gemfibrozil 600 milliGRAM(s) Oral two times a day before meals  insulin lispro (HumaLOG) corrective regimen sliding scale   SubCutaneous three times a day before meals  lisinopril 10 milliGRAM(s) Oral daily  memantine 5 milliGRAM(s) Oral daily  metoprolol     tartrate 25 milliGRAM(s) Oral daily  oxybutynin 5 milliGRAM(s) Oral three times a day  pantoprazole    Tablet 40 milliGRAM(s) Oral before breakfast  simvastatin 10 milliGRAM(s) Oral at bedtime    MEDICATIONS  (PRN):  dextrose Gel 1 Dose(s) Oral once PRN Blood Glucose LESS THAN 70 milliGRAM(s)/deciliter  glucagon  Injectable 1 milliGRAM(s) IntraMuscular once PRN Glucose LESS THAN 70 milligrams/deciliter      Allergies    No Known Allergies    Intolerances      PAST MEDICAL & SURGICAL HISTORY:  Overactive bladder  Dementia  High cholesterol  HTN (hypertension)  DM (diabetes mellitus)      Vital Signs Last 24 Hrs  T(C): 37.1 (2018 10:45), Max: 37.1 (2018 00:35)  T(F): 98.7 (2018 10:45), Max: 98.7 (2018 00:35)  HR: 77 (2018 10:45) (68 - 77)  BP: 119/67 (2018 10:45) (119/67 - 164/70)  BP(mean): --  RR: 19 (2018 10:45) (18 - 19)  SpO2: 95% (2018 06:14) (93% - 97%)    Physical Exam:  Constitutional: NAD, AAOx3  Cardiovascular: +S1S2 RRR  Pulmonary: CTA b/l, unlabored  GI: soft NTND +BS  Extremities: no pedal edema, +distal pulses b/l  Neuro: non focal, IRVING x4    LABS:                        12.4   16.3  )-----------( 301      ( 2018 12:29 )             38.3         138  |  97<L>  |  12.0  ----------------------------<  259<H>  4.2   |  24.0  |  0.46<L>    Ca    9.5      2018 12:29    TPro  7.9  /  Alb  4.2  /  TBili  0.3<L>  /  DBili  x   /  AST  25  /  ALT  11  /  AlkPhos  155<H>  23    PT/INR - ( 2018 12:29 )   PT: 13.4 sec;   INR: 1.21 ratio         PTT - ( 2018 12:29 )  PTT:34.6 sec  Urinalysis Basic - ( 2018 09:13 )    Color: Yellow / Appearance: Clear / S.015 / pH: x  Gluc: x / Ketone: Negative  / Bili: Negative / Urobili: Negative mg/dL   Blood: x / Protein: 15 mg/dL / Nitrite: Negative   Leuk Esterase: Negative / RBC: x / WBC 3-5   Sq Epi: x / Non Sq Epi: Occasional / Bacteria: x    RADIOLOGY & ADDITIONAL TESTS:

## 2018-02-24 NOTE — PROGRESS NOTE ADULT - ASSESSMENT
89 yr old female with Shortness of breath  concerning for   CHF/Cardiomyopathy- from possible valvular heart disease MS and MR.   Improved with diuresis. Cardio Dr castaneda, echo, recommend continue lisinopril and metoprolol.  Aspirin, Statin.     leukocytosis- possible pneumonia . ID dr Dunham thinks that pneumonia is very unlikely and would stop abx. Continue workup for CHF.      DM- HISS    htn -  Norvasc 10mg po daily    GERD- Protonix     Dementia and delirium - Aricept 10mg po daily . Namenda 5mg po  daily . Celexa 20mg po daily     OAB- oxybutynin 89 yr old female with Shortness of breath  concerning for   CHF/Cardiomyopathy- from possible valvular heart disease AS, MS and MR  Improved with diuresis. Cardio Dr castaneda, echo, recommend continue lisinopril and metoprolol.  Aspirin, Statin.     leukocytosis- possible pneumonia . ID dr Dunham thinks that pneumonia is very unlikely and would stop abx. Continue workup for CHF.      DM- HISS    htn -  Norvasc 10mg po daily    GERD- Protonix     Dementia and delirium - Aricept 10mg po daily . Namenda 5mg po  daily . Celexa 20mg po daily     OAB- oxybutynin

## 2018-02-24 NOTE — PROGRESS NOTE ADULT - SUBJECTIVE AND OBJECTIVE BOX
CC: Shortness of  breath is resolved. Patient said she had become very anxious and had mental status changes. Leukocytosis  HPI:  89 yr olf female with known DM, htn, Dementia BIBEMS for SOB given nitrate and CPAP, SOB worsening over last couple days , positive associated with cough, no fever , no phlegm , no chills, no CP, no palpitations , denied edema, denied orthopnea, hasn't seen a cardiologist anita really long time , hasn't been around sick contacts, lives alone at home with aids to help, she has week legs and is essentially wheel chair bound, denied dysuria or skin break or diarrhea (2018 16:40)    REVIEW OF SYSTEMS:    Patient denied fever, chills, abdominal pain, nausea, vomiting, cough, shortness of breath, chest pain or palpitations    Vital Signs Last 24 Hrs  T(C): 37.1 (2018 10:45), Max: 37.1 (2018 00:35)  T(F): 98.7 (2018 10:45), Max: 98.7 (2018 00:35)  HR: 77 (2018 10:45) (68 - 77)  BP: 119/67 (2018 10:45) (119/67 - 163/66)  BP(mean): --  RR: 19 (2018 10:45) (18 - 19)  SpO2: 95% (2018 06:14) (93% - 97%)I&O's Summary    PHYSICAL EXAM:  GENERAL: NAD  HEENT: PERRL, +EOMI, anicteric, no [place]  NECK: Supple, No JVD   CHEST/LUNG: CTA bilaterally; Normal effort  HEART: S1S2 Normal intensity, no murmurs, gallops or rubs noted  ABDOMEN: Soft, BS Normoactive, NT, ND, no HSM noted  EXTREMITIES:  2+ radial and DP pulses noted, no clubbing, cyanosis, or edema noted. Limited mobility   SKIN: No rashes or lesions noted  NEURO: A&O, no focal deficits noted, CN II-XII intact  PSYCH: Anxious , depressed  mood and affect; insight/judgement appropriate  LABS:                        10.7   14.0  )-----------( 299      ( 2018 14:02 )             34.2     02-    134<L>  |  94<L>  |  25.0<H>  ----------------------------<  195<H>  4.5   |  25.0  |  0.70    Ca    9.1      2018 14:02    TPro  7.3  /  Alb  3.7  /  TBili  0.2<L>  /  DBili  x   /  AST  25  /  ALT  11  /  AlkPhos  122<H>      PT/INR - ( 2018 12:29 )   PT: 13.4 sec;   INR: 1.21 ratio         PTT - ( 2018 12:29 )  PTT:34.6 sec  Urinalysis Basic - ( 2018 09:13 )    Color: Yellow / Appearance: Clear / S.015 / pH: x  Gluc: x / Ketone: Negative  / Bili: Negative / Urobili: Negative mg/dL   Blood: x / Protein: 15 mg/dL / Nitrite: Negative   Leuk Esterase: Negative / RBC: x / WBC 3-5   Sq Epi: x / Non Sq Epi: Occasional / Bacteria: x      RADIOLOGY & ADDITIONAL TESTS:    MEDICATIONS:  MEDICATIONS  (STANDING):  amLODIPine   Tablet 5 milliGRAM(s) Oral two times a day  aspirin  chewable 81 milliGRAM(s) Oral daily  dextrose 5%. 1000 milliLiter(s) (50 mL/Hr) IV Continuous <Continuous>  dextrose 50% Injectable 12.5 Gram(s) IV Push once  dextrose 50% Injectable 25 Gram(s) IV Push once  dextrose 50% Injectable 25 Gram(s) IV Push once  donepezil 10 milliGRAM(s) Oral at bedtime  enoxaparin Injectable 40 milliGRAM(s) SubCutaneous daily  escitalopram 20 milliGRAM(s) Oral daily  furosemide   Injectable 20 milliGRAM(s) IV Push daily  gemfibrozil 600 milliGRAM(s) Oral two times a day before meals  insulin lispro (HumaLOG) corrective regimen sliding scale   SubCutaneous three times a day before meals  lisinopril 10 milliGRAM(s) Oral daily  memantine 5 milliGRAM(s) Oral daily  metoprolol     tartrate 25 milliGRAM(s) Oral daily  oxybutynin 5 milliGRAM(s) Oral three times a day  pantoprazole    Tablet 40 milliGRAM(s) Oral before breakfast  simvastatin 10 milliGRAM(s) Oral at bedtime    MEDICATIONS  (PRN):  dextrose Gel 1 Dose(s) Oral once PRN Blood Glucose LESS THAN 70 milliGRAM(s)/deciliter  glucagon  Injectable 1 milliGRAM(s) IntraMuscular once PRN Glucose LESS THAN 70 milligrams/deciliter CC: Shortness of  breath is resolved. Patient said she had become very anxious and had mental status changes. Leukocytosis  AS, MR, MS valvulopathy with cardiomyopathy grade 111 diastolic dysfunction EF 55% on echo   HPI:  89 yr olf female with known DM, htn, Dementia BIBEMS for SOB given nitrate and CPAP, SOB worsening over last couple days , positive associated with cough, no fever , no phlegm , no chills, no CP, no palpitations , denied edema, denied orthopnea, hasn't seen a cardiologist anita really long time , hasn't been around sick contacts, lives alone at home with aids to help, she has week legs and is essentially wheel chair bound, denied dysuria or skin break or diarrhea (2018 16:40)    REVIEW OF SYSTEMS:    Patient denied fever, chills, abdominal pain, nausea, vomiting, cough, shortness of breath, chest pain or palpitations    Vital Signs Last 24 Hrs  T(C): 37.1 (2018 10:45), Max: 37.1 (2018 00:35)  T(F): 98.7 (2018 10:45), Max: 98.7 (2018 00:35)  HR: 77 (2018 10:45) (68 - 77)  BP: 119/67 (2018 10:45) (119/67 - 163/66)  BP(mean): --  RR: 19 (2018 10:45) (18 - 19)  SpO2: 95% (2018 06:14) (93% - 97%)I&O's Summary    PHYSICAL EXAM:  GENERAL: NAD  HEENT: PERRL, +EOMI, anicteric, no [place]  NECK: Supple, No JVD   CHEST/LUNG: CTA bilaterally; Normal effort  HEART: S1S2 Normal intensity, no murmurs, gallops or rubs noted  ABDOMEN: Soft, BS Normoactive, NT, ND, no HSM noted  EXTREMITIES:  2+ radial and DP pulses noted, no clubbing, cyanosis, or edema noted. Limited mobility   SKIN: No rashes or lesions noted  NEURO: A&O, no focal deficits noted, CN II-XII intact  PSYCH: Anxious , depressed  mood and affect; insight/judgement appropriate  LABS:                        10.7   14.0  )-----------( 299      ( 2018 14:02 )             34.2     02-    134<L>  |  94<L>  |  25.0<H>  ----------------------------<  195<H>  4.5   |  25.0  |  0.70    Ca    9.1      2018 14:02    TPro  7.3  /  Alb  3.7  /  TBili  0.2<L>  /  DBili  x   /  AST  25  /  ALT  11  /  AlkPhos  122<H>  02-24    PT/INR - ( 2018 12:29 )   PT: 13.4 sec;   INR: 1.21 ratio         PTT - ( 2018 12:29 )  PTT:34.6 sec  Urinalysis Basic - ( 2018 09:13 )    Color: Yellow / Appearance: Clear / S.015 / pH: x  Gluc: x / Ketone: Negative  / Bili: Negative / Urobili: Negative mg/dL   Blood: x / Protein: 15 mg/dL / Nitrite: Negative   Leuk Esterase: Negative / RBC: x / WBC 3-5   Sq Epi: x / Non Sq Epi: Occasional / Bacteria: x      RADIOLOGY & ADDITIONAL TESTS:    MEDICATIONS:  MEDICATIONS  (STANDING):  amLODIPine   Tablet 5 milliGRAM(s) Oral two times a day  aspirin  chewable 81 milliGRAM(s) Oral daily  dextrose 5%. 1000 milliLiter(s) (50 mL/Hr) IV Continuous <Continuous>  dextrose 50% Injectable 12.5 Gram(s) IV Push once  dextrose 50% Injectable 25 Gram(s) IV Push once  dextrose 50% Injectable 25 Gram(s) IV Push once  donepezil 10 milliGRAM(s) Oral at bedtime  enoxaparin Injectable 40 milliGRAM(s) SubCutaneous daily  escitalopram 20 milliGRAM(s) Oral daily  furosemide   Injectable 20 milliGRAM(s) IV Push daily  gemfibrozil 600 milliGRAM(s) Oral two times a day before meals  insulin lispro (HumaLOG) corrective regimen sliding scale   SubCutaneous three times a day before meals  lisinopril 10 milliGRAM(s) Oral daily  memantine 5 milliGRAM(s) Oral daily  metoprolol     tartrate 25 milliGRAM(s) Oral daily  oxybutynin 5 milliGRAM(s) Oral three times a day  pantoprazole    Tablet 40 milliGRAM(s) Oral before breakfast  simvastatin 10 milliGRAM(s) Oral at bedtime    MEDICATIONS  (PRN):  dextrose Gel 1 Dose(s) Oral once PRN Blood Glucose LESS THAN 70 milliGRAM(s)/deciliter  glucagon  Injectable 1 milliGRAM(s) IntraMuscular once PRN Glucose LESS THAN 70 milligrams/deciliter

## 2018-02-25 DIAGNOSIS — I35.0 NONRHEUMATIC AORTIC (VALVE) STENOSIS: ICD-10-CM

## 2018-02-25 LAB
GLUCOSE BLDC GLUCOMTR-MCNC: 179 MG/DL — HIGH (ref 70–99)
GLUCOSE BLDC GLUCOMTR-MCNC: 179 MG/DL — HIGH (ref 70–99)
GLUCOSE BLDC GLUCOMTR-MCNC: 207 MG/DL — HIGH (ref 70–99)
GLUCOSE BLDC GLUCOMTR-MCNC: 231 MG/DL — HIGH (ref 70–99)

## 2018-02-25 PROCEDURE — 76882 US LMTD JT/FCL EVL NVASC XTR: CPT | Mod: 26,LT

## 2018-02-25 PROCEDURE — 99223 1ST HOSP IP/OBS HIGH 75: CPT

## 2018-02-25 PROCEDURE — 99233 SBSQ HOSP IP/OBS HIGH 50: CPT

## 2018-02-25 PROCEDURE — 99232 SBSQ HOSP IP/OBS MODERATE 35: CPT

## 2018-02-25 RX ORDER — METOPROLOL TARTRATE 50 MG
25 TABLET ORAL DAILY
Qty: 0 | Refills: 0 | Status: DISCONTINUED | OUTPATIENT
Start: 2018-02-25 | End: 2018-02-25

## 2018-02-25 RX ADMIN — Medication 5 MILLIGRAM(S): at 23:04

## 2018-02-25 RX ADMIN — LISINOPRIL 10 MILLIGRAM(S): 2.5 TABLET ORAL at 05:41

## 2018-02-25 RX ADMIN — Medication 600 MILLIGRAM(S): at 09:09

## 2018-02-25 RX ADMIN — Medication 2: at 09:09

## 2018-02-25 RX ADMIN — Medication 4: at 12:46

## 2018-02-25 RX ADMIN — SIMVASTATIN 10 MILLIGRAM(S): 20 TABLET, FILM COATED ORAL at 23:04

## 2018-02-25 RX ADMIN — Medication 5 MILLIGRAM(S): at 05:41

## 2018-02-25 RX ADMIN — Medication 600 MILLIGRAM(S): at 18:00

## 2018-02-25 RX ADMIN — MEMANTINE HYDROCHLORIDE 5 MILLIGRAM(S): 10 TABLET ORAL at 11:45

## 2018-02-25 RX ADMIN — AMLODIPINE BESYLATE 5 MILLIGRAM(S): 2.5 TABLET ORAL at 05:41

## 2018-02-25 RX ADMIN — Medication 20 MILLIGRAM(S): at 05:41

## 2018-02-25 RX ADMIN — Medication 25 MILLIGRAM(S): at 05:41

## 2018-02-25 RX ADMIN — DONEPEZIL HYDROCHLORIDE 10 MILLIGRAM(S): 10 TABLET, FILM COATED ORAL at 23:04

## 2018-02-25 RX ADMIN — Medication 5 MILLIGRAM(S): at 14:03

## 2018-02-25 RX ADMIN — Medication 81 MILLIGRAM(S): at 11:44

## 2018-02-25 RX ADMIN — Medication 4: at 18:00

## 2018-02-25 RX ADMIN — AMLODIPINE BESYLATE 5 MILLIGRAM(S): 2.5 TABLET ORAL at 18:08

## 2018-02-25 RX ADMIN — ENOXAPARIN SODIUM 40 MILLIGRAM(S): 100 INJECTION SUBCUTANEOUS at 23:04

## 2018-02-25 RX ADMIN — ESCITALOPRAM OXALATE 20 MILLIGRAM(S): 10 TABLET, FILM COATED ORAL at 11:44

## 2018-02-25 RX ADMIN — PANTOPRAZOLE SODIUM 40 MILLIGRAM(S): 20 TABLET, DELAYED RELEASE ORAL at 05:40

## 2018-02-25 NOTE — PROGRESS NOTE ADULT - SUBJECTIVE AND OBJECTIVE BOX
CC: Shortness of breath. Cardiomyopathy.  AS, MR, MS.    HPI:  89 yr olf female with known DM, htn, Dementia BIBEMS for SOB given nitrate and CPAP, SOB worsening over last couple days , positive associated with cough, no fever , no phlegm , no chills, no CP, no palpitations , denied edema, denied orthopnea, hasn't seen a cardiologist anita really long time , hasn't been around sick contacts, lives alone at home with aids to help, she has week legs and is essentially wheel chair bound, denied dysuria or skin break or diarrhea (2018 16:40)    REVIEW OF SYSTEMS:    Patient denied fever, chills, abdominal pain, nausea, vomiting, cough, shortness of breath, chest pain or palpitations    Vital Signs Last 24 Hrs  T(C): 36.9 (2018 10:57), Max: 37.1 (2018 15:15)  T(F): 98.4 (2018 10:57), Max: 98.8 (2018 15:15)  HR: 49 (2018 10:57) (39 - 65)  BP: 117/51 (2018 10:57) (117/51 - 142/56)  BP(mean): --  RR: 20 (2018 10:57) (18 - 20)  SpO2: 100% (2018 10:57) (94% - 100%)I&O's Summary    2018 07:01  -  2018 12:03  --------------------------------------------------------  IN: 420 mL / OUT: 650 mL / NET: -230 mL      PHYSICAL EXAM:  GENERAL: NAD, well-groomed  HEENT: PERRL, +EOMI, anicteric, no [place]  NECK: Supple, No JVD   CHEST/LUNG: bilateral rales   HEART: S1S2 Normal intensity, no murmurs, gallops or rubs noted  ABDOMEN: Soft, BS Normoactive, NT, ND, no HSM noted  EXTREMITIES:  2+ radial and DP pulses noted, no clubbing, cyanosis, or edema noted, FROM x 4  SKIN: No rashes or lesions noted  NEURO: A&Ox3, no focal deficits noted, CN II-XII intact  PSYCH: Depressed mood and affect; insight/judgement appropriate  LABS:                        10.7   14.0  )-----------( 299      ( 2018 14:02 )             34.2     02-24    134<L>  |  94<L>  |  25.0<H>  ----------------------------<  195<H>  4.5   |  25.0  |  0.70    Ca    9.1      2018 14:02    TPro  7.3  /  Alb  3.7  /  TBili  0.2<L>  /  DBili  x   /  AST  25  /  ALT  11  /  AlkPhos  122<H>  02-24    PT/INR - ( 2018 12:29 )   PT: 13.4 sec;   INR: 1.21 ratio         PTT - ( 2018 12:29 )  PTT:34.6 sec  Urinalysis Basic - ( 2018 09:13 )    Color: Yellow / Appearance: Clear / S.015 / pH: x  Gluc: x / Ketone: Negative  / Bili: Negative / Urobili: Negative mg/dL   Blood: x / Protein: 15 mg/dL / Nitrite: Negative   Leuk Esterase: Negative / RBC: x / WBC 3-5   Sq Epi: x / Non Sq Epi: Occasional / Bacteria: x      RADIOLOGY & ADDITIONAL TESTS:    MEDICATIONS:  MEDICATIONS  (STANDING):  amLODIPine   Tablet 5 milliGRAM(s) Oral two times a day  aspirin  chewable 81 milliGRAM(s) Oral daily  dextrose 5%. 1000 milliLiter(s) (50 mL/Hr) IV Continuous <Continuous>  dextrose 50% Injectable 12.5 Gram(s) IV Push once  dextrose 50% Injectable 25 Gram(s) IV Push once  dextrose 50% Injectable 25 Gram(s) IV Push once  donepezil 10 milliGRAM(s) Oral at bedtime  enoxaparin Injectable 40 milliGRAM(s) SubCutaneous daily  escitalopram 20 milliGRAM(s) Oral daily  furosemide   Injectable 20 milliGRAM(s) IV Push daily  gemfibrozil 600 milliGRAM(s) Oral two times a day before meals  insulin lispro (HumaLOG) corrective regimen sliding scale   SubCutaneous three times a day before meals  lisinopril 10 milliGRAM(s) Oral daily  memantine 5 milliGRAM(s) Oral daily  oxybutynin 5 milliGRAM(s) Oral three times a day  pantoprazole    Tablet 40 milliGRAM(s) Oral before breakfast  simvastatin 10 milliGRAM(s) Oral at bedtime    MEDICATIONS  (PRN):  dextrose Gel 1 Dose(s) Oral once PRN Blood Glucose LESS THAN 70 milliGRAM(s)/deciliter  glucagon  Injectable 1 milliGRAM(s) IntraMuscular once PRN Glucose LESS THAN 70 milligrams/deciliter

## 2018-02-25 NOTE — PROGRESS NOTE ADULT - ASSESSMENT
89F with HTN, DM, dementia, LBBB p/w acute SOB with CXR consistent with pulmonary edema. BNP elevated, and findings consistent with CHF.   Treated with Lasix, pulse ox stable on RA.   Patient with moderate to severe aortic valve stenosis, and MS/M.  Plan to consider CT evaluation vs Palliative care.      Assessment:  CHF  Valvular disease      Plan  Palliative care consult  restart BB  Ct Lasix  tele  monitor I and O's continued 89F with HTN, DM, dementia, LBBB p/w acute SOB with CXR consistent with pulmonary edema. BNP elevated, and findings consistent with CHF.   Treated with Lasix, pulse ox stable on RA.   Patient with moderate to severe aortic valve stenosis, and MS/M.  Plan to consider CT evaluation vs Palliative care.      Assessment:  CHF  Valvular disease      Plan  Palliative care consult  Ct Lasix  tele  monitor I and O's continued

## 2018-02-25 NOTE — CONSULT NOTE ADULT - SUBJECTIVE AND OBJECTIVE BOX
History of Present Illness:  89F with HTN, DM, dementia, LBBB p/w acute SOB with CXR consistent with pulmonary edema. BNP elevated, and findings consistent with CHF.   Treated with Lasix, pulse ox stable on RA.     Patient with moderate to severe aortic valve stenosis, and MS/MR,     Past Medical History  Overactive bladder  Dementia  High cholesterol  Burn by hot liquid  HTN (hypertension)  DM (diabetes mellitus)            MEDICATIONS  (STANDING):  amLODIPine   Tablet 5 milliGRAM(s) Oral two times a day  aspirin  chewable 81 milliGRAM(s) Oral daily  dextrose 5%. 1000 milliLiter(s) (50 mL/Hr) IV Continuous <Continuous>  dextrose 50% Injectable 12.5 Gram(s) IV Push once  dextrose 50% Injectable 25 Gram(s) IV Push once  dextrose 50% Injectable 25 Gram(s) IV Push once  donepezil 10 milliGRAM(s) Oral at bedtime  enoxaparin Injectable 40 milliGRAM(s) SubCutaneous daily  escitalopram 20 milliGRAM(s) Oral daily  furosemide   Injectable 20 milliGRAM(s) IV Push daily  gemfibrozil 600 milliGRAM(s) Oral two times a day before meals  insulin lispro (HumaLOG) corrective regimen sliding scale   SubCutaneous three times a day before meals  lisinopril 10 milliGRAM(s) Oral daily  memantine 5 milliGRAM(s) Oral daily  oxybutynin 5 milliGRAM(s) Oral three times a day  pantoprazole    Tablet 40 milliGRAM(s) Oral before breakfast  simvastatin 10 milliGRAM(s) Oral at bedtime    MEDICATIONS  (PRN):  dextrose Gel 1 Dose(s) Oral once PRN Blood Glucose LESS THAN 70 milliGRAM(s)/deciliter  glucagon  Injectable 1 milliGRAM(s) IntraMuscular once PRN Glucose LESS THAN 70 milligrams/deciliter    Antiplatelet therapy:                           Last dose/amt:    Allergies: No Known Allergies      SOCIAL HISTORY:  Smoker: [x ] Yes  [ ] No        PACK YEARS: 20                        WHEN QUIT?  ETOH use: [ ] Yes  [ ] No              FREQUENCY / QUANTITY:  Ilicit Drug use:  [ ] Yxes  [x ] No  Occupation: retired   Live with: alone, > 24 hour live in home health aide  Assist device use: walker    Relevant Family History  FAMILY HISTORY:  No pertinent family history in first degree relatives      Review of Systems  GENERAL:  Fevers[] chills[] sweats[] fatigue[] weight loss[] weight gain []                                        NEURO:  parathesias[] seizures []  syncope [x]  confusion []                                                                                  EYES: glasses[]  blurry vision[]  discharge[] pain[] glaucoma []                                                                            ENMT:  difficulty hearing []  vertigo[]  dysphagia[] epistaxis[] recent dental work []                                      CV:  chest pain[] palpitations[] ARZOLA [x] diaphoresis [] edema[]                                                                                             RESPIRATORY:  wheezing[] SOB[] cough [] sputum[] hemoptysis[]                                                                    GI:  nausea[]  vomiting []  diarrhea[] constipation [] melena []                                                                        : hematuria[ ]  dysuria[ ] urgency[] incontinence[]                                                                                              MUSKULOSKELETAL:  arthritis[ ]  joint swelling [ ] muscle weakness [ ]                                                                  SKIN/BREAST:  rash[ ] itching [ ]  hair loss[ ] masses[ ]                                                                                                PSYCH:  dementia [x ] depression [ ] anxiety[ ]                                                                                                                  HEME/LYMPH:  bruises easily[ ] enlarged lymph nodes[ ] tender lymph nodes[ ]                                                 ENDOCRINE:  cold intolerance[ ] heat intolerance[ ] polydipsia[ ]                                                                              PHYSICAL EXAM  Vital Signs Last 24 Hrs  T(C): 36.9 (2018 15:13), Max: 36.9 (2018 22:15)  T(F): 98.4 (2018 15:13), Max: 98.4 (2018 22:15)  HR: 59 (2018 15:13) (39 - 65)  BP: 142/54 (2018 15:13) (117/51 - 142/56)  RR: 18 (2018 15:13) (18 - 20)  SpO2: 100% (2018 15:13) (97% - 100%)    General: Well nourished, well developed, no acute distress.                                                         Neuro: Normal exam oriented to person/place & time with no focal motor or sensory  deficits.                    Eyes: Normal exam of conjunctiva & lids, pupils equally reactive.   ENT: Normal exam of nasal/oral mucosa with absence of cyanosis.   Neck: Normal exam of jugular veins, trachea & thyroid.   Chest: Normal lung exam with good air movement absence of wheezes, rales, or rhonchi:                                                                          CV:  Auscultation: normal [x ] S3[ ] S4[ ] Irregular [ ] Rub[ ] Clicks[ ]  Murmurs none:[ ]systolic [x ] IV/VI LOIDA  diastolic [ ] holosystolic [ ]  Carotids: No Bruits[x ] Other____________ Abdominal Aorta: normal [ ] nonpalpable[ ]                                                                         GI: Normal exam of abdomen, liver & spleen with no noted masses or tenderness.                                                                                              Extremities: Normal no evidence of cyanosis or deformity Edema: none[x ]trace[ ]1+[ ]2+[ ]3+[ ]4+[ ]  Lower Extremity Pulses: Right[ ] Left[ ]Varicosities[x ]  SKIN : Normal exam to inspection & palpation.                                                           LABS:                        10.7   14.0  )-----------( 299      ( 2018 14:02 )             34.2         134<L>  |  94<L>  |  25.0<H>  ----------------------------<  195<H>  4.5   |  25.0  |  0.70    Ca    9.1      2018 14:02    TPro  7.3  /  Alb  3.7  /  TBili  0.2<L>  /  DBili  x   /  AST  25  /  ALT  11  /  AlkPhos  122<H>  24      Urinalysis Basic - ( 2018 09:13 )    Color: Yellow / Appearance: Clear / S.015 / pH: x  Gluc: x / Ketone: Negative  / Bili: Negative / Urobili: Negative mg/dL   Blood: x / Protein: 15 mg/dL / Nitrite: Negative   Leuk Esterase: Negative / RBC: x / WBC 3-5   Sq Epi: x / Non Sq Epi: Occasional / Bacteria: x      CARDIAC MARKERS ( 2018 07:27 )  x     / 0.01 ng/mL / x     / x     / x      CARDIAC MARKERS ( 2018 19:24 )  x     / 0.02 ng/mL / x     / x     / x          TTE / MISA:   Technically limited study.   2. Endocardial visualization was enhanced with intravenous echo contrast.   3. Normal global left ventricular systolic function.   4. Left ventricular ejection fraction, by visual estimation, is 55%.   5. Spectral Doppler shows restrictive pattern of left ventricular  myocardial filling (Grade III diastolic dysfunction).   6. Normal right ventricular size and function.   7. Moderate to severe aortic valve stenosis.   8. Thickening and calcification of the anterior and posterior mitral   valve leaflets.   9. Moderate mitral valve regurgitation.  10. Mitral valve mean gradient is 5.0 mmHg consistent with moderate   mitral stenosis.  11. Moderate mitral annular calcification.  12. Mild-moderate tricuspid regurgitation.  13. Estimated pulmonary artery systolic pressure is 55.6 mmHg assuming a   right atrial pressure of 8

## 2018-02-25 NOTE — PROGRESS NOTE ADULT - SUBJECTIVE AND OBJECTIVE BOX
[facility]  INFECTIOUS DISEASES AND INTERNAL MEDICINE at [place]  =======================================================  Alfonso Arias MD [facility]   Ajith Bonds MD  Diplomates American Board of Internal Medicine and Infectious Diseases  =======================================================    SHARON HARDING [id number]  chief complaint: bilateral lung congestion, WBC elevation    patient seen and examined in follow up.  Chart and labs reviewed.     remains afebrile. WBC down to 14 from 16k.   sitting up in chair eating lunch,, does not like food here.   =======================================================  Allergies:  No Known Allergies      =======================================================  Medications:  amLODIPine   Tablet 5 milliGRAM(s) Oral two times a day  aspirin  chewable 81 milliGRAM(s) Oral daily  dextrose 5%. 1000 milliLiter(s) IV Continuous <Continuous>  dextrose 50% Injectable 12.5 Gram(s) IV Push once  dextrose 50% Injectable 25 Gram(s) IV Push once  dextrose 50% Injectable 25 Gram(s) IV Push once  dextrose Gel 1 Dose(s) Oral once PRN  donepezil 10 milliGRAM(s) Oral at bedtime  enoxaparin Injectable 40 milliGRAM(s) SubCutaneous daily  escitalopram 20 milliGRAM(s) Oral daily  furosemide   Injectable 20 milliGRAM(s) IV Push daily  gemfibrozil 600 milliGRAM(s) Oral two times a day before meals  glucagon  Injectable 1 milliGRAM(s) IntraMuscular once PRN  insulin lispro (HumaLOG) corrective regimen sliding scale   SubCutaneous three times a day before meals  lisinopril 10 milliGRAM(s) Oral daily  memantine 5 milliGRAM(s) Oral daily  oxybutynin 5 milliGRAM(s) Oral three times a day  pantoprazole    Tablet 40 milliGRAM(s) Oral before breakfast  simvastatin 10 milliGRAM(s) Oral at bedtime       =======================================================     REVIEW OF SYSTEMS:  CONSTITUTIONAL:  No Fever or chills  HEENT:   No diplopia or blurred vision.  No earache, sore throat or runny nose.  CARDIOVASCULAR:  No pressure, squeezing, strangling, tightness, heaviness or aching about the chest, neck, axilla or epigastrium.  RESPIRATORY:  No cough, shortness of breath, PND or orthopnea.  GASTROINTESTINAL:  No nausea, vomiting or diarrhea.  GENITOURINARY:  No dysuria, frequency or urgency. No Blood in urine  MUSCULOSKELETAL:  no joint aches, no muscle pain  SKIN:  No change in skin, hair or nails.  NEUROLOGIC:  No paresthesias, fasciculations, seizures or weakness.  PSYCHIATRIC:  No disorder of thought or mood.  ENDOCRINE:  No heat or cold intolerance, polyuria or polydipsia.  HEMATOLOGICAL:  No easy bruising or bleeding.     =======================================================    Physical Exam:    Vital Signs Last 24 Hrs  T(C): 36.9 (2018 10:57), Max: 37.1 (2018 15:15)  T(F): 98.4 (2018 10:57), Max: 98.8 (2018 15:15)  HR: 49 (2018 10:57) (39 - 65)  BP: 117/51 (2018 10:57) (117/51 - 142/56)  RR: 20 (2018 10:57) (18 - 20)  SpO2: 100% (2018 10:57) (94% - 100%)  GEN: NAD, pleasant  HEENT: normocephalic and atraumatic. EOMI. VIVIAN.    NECK: Supple. No carotid bruits.  No lymphadenopathy or thyromegaly.  LUNGS: rales bilaterally,.. no significant changes  HEART: Regular rate and rhythm without murmur.  ABDOMEN: Soft, nontender, and nondistended.  Positive bowel sounds.    : No CVA tenderness  EXTREMITIES: Without any cyanosis, clubbing, rash, lesions or edema.  MSK: no joint swelling  NEUROLOGIC: Cranial nerves II through XII are grossly intact.  PSYCHIATRIC: Appropriate affect .  SKIN: No ulceration or induration present.    =======================================================    Labs:      134<L>  |  94<L>  |  25.0<H>  ----------------------------<  195<H>  4.5   |  25.0  |  0.70    Ca    9.1      2018 14:02    TPro  7.3  /  Alb  3.7  /  TBili  0.2<L>  /  DBili  x   /  AST  25  /  ALT  11  /  AlkPhos  122<H>                            10.7   14.0  )-----------( 299      ( 2018 14:02 )             34.2         Urinalysis Basic - ( 2018 09:13 )    Color: Yellow / Appearance: Clear / S.015 / pH: x  Gluc: x / Ketone: Negative  / Bili: Negative / Urobili: Negative mg/dL   Blood: x / Protein: 15 mg/dL / Nitrite: Negative   Leuk Esterase: Negative / RBC: x / WBC 3-5   Sq Epi: x / Non Sq Epi: Occasional / Bacteria: x      LIVER FUNCTIONS - ( 2018 14:02 )  Alb: 3.7 g/dL / Pro: 7.3 g/dL / ALK PHOS: 122 U/L / ALT: 11 U/L / AST: 25 U/L / GGT: x           CARDIAC MARKERS ( 2018 07:27 )  x     / 0.01 ng/mL / x     / x     / x      CARDIAC MARKERS ( 2018 19:24 )  x     / 0.02 ng/mL / x     / x     / x          RECENT CULTURES:   @ 14:47 .Urine Catheterized     <10,000 CFU/ml Gram Positive Rods  <10,000 CFU/ml Gram Positive Cocci in Pairs and Chains         @ 12:47      [facility]

## 2018-02-25 NOTE — PROGRESS NOTE ADULT - SUBJECTIVE AND OBJECTIVE BOX
[facility]                                                       [facility]                                                        Office: [address], [place] [address]                                                       Telephone: [phone]. Fax:[phone]                                                                             PROGRESS NOTE   Reason for follow up:    CHF,                            Overnight:  Sb on monitor, restart KALEY, slava Olvera.    Update:   89F with HTN, DM, dementia, LBBB p/w acute SOB with CXR consistent with pulmonary edema. BNP elevated, and findings consistent with CHF.   Treated with Lasix, pulse ox stable on RA.   Patient with moderate to severe aortic valve stenosis, and MS/MR,   Plan to consider CT evaluation vs Palliative care.      Subjective:  "I want to go home."   Complains of:   Nothing  Review of symptoms: Cardiac:  No chest pain. No dyspnea. No palpitations.  Respiratory: no cough. No dyspnea  Gastrointestinal: No diarrhea. No abdominal pain. No bleeding.     Past medical history: No updates.   Chronic conditions:  Hypertension: controlled. CHF: Stable. CAD: Stable ischemic heart disease. DM: Stable;    	  Vitals:  T(C): 36.6 (02-25-18 @ 05:35), Max: 37.1 (02-24-18 @ 10:45)  HR: 39 (02-25-18 @ 06:49) (39 - 77)  BP: 142/56 (02-25-18 @ 05:35) (119/67 - 142/56)  RR: 18 (02-25-18 @ 05:35) (18 - 19)  SpO2: 99% (02-25-18 @ 05:35) (94% - 99%)    IPHYSICAL EXAM:  Appearance: Comfortable. No acute distress, elderly female.   HEENT:  Head and neck: Atraumatic. Normocephalic.  Normal oral mucosa, PERRL, Neck is supple. No JVD, No carotid bruit.   Neurologic: A & O x 3, no focal deficits. EOMI , Cranial nerves are intact.  Lymphatic: No cervical lymphadenopathy  Cardiovascular: Normal S1 S2, + soft, blowing murmur, no rubs/gallops. No JVD, No edema  Respiratory:  Fine rales in lower bases bilaterally, rr wnl for rate and rhythm, color pink, no distress noted on RA.    Gastrointestinal:  Soft, Non-tender, + BS  Lower Extremities: No edema  Psychiatry: Patient is calm. No agitation. Mood & affect appropriate  Skin: No rashes/cyanosis/ulcers visualized on the face, hands or feet.  Brusing noted on bilateral arms from needle sticks.      CURRENT MEDICATIONS:  amLODIPine   Tablet 5 milliGRAM(s) Oral two times a day  furosemide   Injectable 20 milliGRAM(s) IV Push daily  lisinopril 10 milliGRAM(s) Oral daily  metoprolol succinate ER 25 milliGRAM(s) Oral daily  donepezil  escitalopram  memantine  pantoprazole    Tablet  dextrose 50% Injectable  dextrose 50% Injectable  dextrose 50% Injectable  gemfibrozil  insulin lispro (HumaLOG) corrective regimen sliding scale  simvastatin  aspirin  chewable  dextrose 5%.  enoxaparin Injectable  oxybutynin      LABS:	 	  CARDIAC MARKERS ( 24 Feb 2018 07:27 )  x     / 0.01 ng/mL / x     / x     / x      p-BNP 24 Feb 2018 07:27: x    , CARDIAC MARKERS ( 23 Feb 2018 19:24 )  x     / 0.02 ng/mL / x     / x     / x      p-BNP 23 Feb 2018 19:24: x    , CARDIAC MARKERS ( 23 Feb 2018 12:29 )  x     / 0.01 ng/mL / x     / x     / x      p-BNP 23 Feb 2018 12:29: 1550 pg/mL                        10.7   14.0  )-----------( 299      ( 24 Feb 2018 14:02 )             34.2     134<L>  |  94<L>  |  25.0<H>  ----------------------------<  195<H>  4.5   |  25.0  |  0.70    Ca    9.1      24 Feb 2018 14:02    TPro  7.3  /  Alb  3.7  /  TBili  0.2<L>  /  DBili  x   /  AST  25  /  ALT  11  /  AlkPhos  122<H>  02-24    proBNP: Serum Pro-Brain Natriuretic Peptide: 1550 pg/mL (02-23 @ 12:29)  Lipid Profile:   HgA1c: Hemoglobin A1C, Whole Blood: 6.8 %      TELEMETRY: SB overight, daytime 50's continue BB [facility]                                                       [facility]                                                        Office: [address], [place] [address]                                                       Telephone: [phone]. Fax:[phone]                                                                             PROGRESS NOTE   Reason for follow up:    CHF,                            Overnight:  Sb on monitor, restart KALEY, slava Olvera.    Update:   89F with HTN, DM, dementia, LBBB p/w acute SOB with CXR consistent with pulmonary edema. BNP elevated, and findings consistent with CHF.   Treated with Lasix, pulse ox stable on RA.   Patient with moderate to severe aortic valve stenosis, and MS/MR,   Plan to consider CT evaluation vs Palliative care.      Subjective:  "I want to go home."   Complains of:   Nothing  Review of symptoms: Cardiac:  No chest pain. No dyspnea. No palpitations.  Respiratory: no cough. No dyspnea  Gastrointestinal: No diarrhea. No abdominal pain. No bleeding.     Past medical history: No updates.   Chronic conditions:  Hypertension: controlled. CHF: Stable. CAD: Stable ischemic heart disease. DM: Stable;    	  Vitals:  T(C): 36.6 (02-25-18 @ 05:35), Max: 37.1 (02-24-18 @ 10:45)  HR: 39 (02-25-18 @ 06:49) (39 - 77)  BP: 142/56 (02-25-18 @ 05:35) (119/67 - 142/56)  RR: 18 (02-25-18 @ 05:35) (18 - 19)  SpO2: 99% (02-25-18 @ 05:35) (94% - 99%)    IPHYSICAL EXAM:  Appearance: Comfortable. No acute distress, elderly female.   HEENT:  Head and neck: Atraumatic. Normocephalic.  Normal oral mucosa, PERRL, Neck is supple. No JVD, No carotid bruit.   Neurologic: A & O x 3, no focal deficits. EOMI , Cranial nerves are intact.  Lymphatic: No cervical lymphadenopathy  Cardiovascular: Normal S1 S2, + soft, blowing murmur, no rubs/gallops. No JVD, No edema  Respiratory:  Fine rales in lower bases bilaterally, rr wnl for rate and rhythm, color pink, no distress noted on RA.    Gastrointestinal:  Soft, Non-tender, + BS  Lower Extremities: No edema  Psychiatry: Patient is calm. No agitation. Mood & affect appropriate  Skin: No rashes/cyanosis/ulcers visualized on the face, hands or feet.  Brusing noted on bilateral arms from needle sticks.      CURRENT MEDICATIONS:  amLODIPine   Tablet 5 milliGRAM(s) Oral two times a day  furosemide   Injectable 20 milliGRAM(s) IV Push daily  lisinopril 10 milliGRAM(s) Oral daily  metoprolol succinate ER 25 milliGRAM(s) Oral daily  donepezil  escitalopram  memantine  pantoprazole    Tablet  dextrose 50% Injectable  dextrose 50% Injectable  dextrose 50% Injectable  gemfibrozil  insulin lispro (HumaLOG) corrective regimen sliding scale  simvastatin  aspirin  chewable  dextrose 5%.  enoxaparin Injectable  oxybutynin      LABS:	 	  CARDIAC MARKERS ( 24 Feb 2018 07:27 )  x     / 0.01 ng/mL / x     / x     / x      p-BNP 24 Feb 2018 07:27: x    , CARDIAC MARKERS ( 23 Feb 2018 19:24 )  x     / 0.02 ng/mL / x     / x     / x      p-BNP 23 Feb 2018 19:24: x    , CARDIAC MARKERS ( 23 Feb 2018 12:29 )  x     / 0.01 ng/mL / x     / x     / x      p-BNP 23 Feb 2018 12:29: 1550 pg/mL                        10.7   14.0  )-----------( 299      ( 24 Feb 2018 14:02 )             34.2     134<L>  |  94<L>  |  25.0<H>  ----------------------------<  195<H>  4.5   |  25.0  |  0.70    Ca    9.1      24 Feb 2018 14:02    TPro  7.3  /  Alb  3.7  /  TBili  0.2<L>  /  DBili  x   /  AST  25  /  ALT  11  /  AlkPhos  122<H>  02-24    proBNP: Serum Pro-Brain Natriuretic Peptide: 1550 pg/mL (02-23 @ 12:29)  Lipid Profile:   HgA1c: Hemoglobin A1C, Whole Blood: 6.8 %      TELEMETRY: SB overight, daytime 50's

## 2018-02-25 NOTE — CONSULT NOTE ADULT - ASSESSMENT
89y Female presents with Dyspnea found to have severe aortic stenosis> referred for possible  TAVR evaluation

## 2018-02-25 NOTE — PROGRESS NOTE ADULT - ASSESSMENT
89 yr old female with Shortness of breath  concerning for-     CHF/Cardiomyopathy- from possible valvular heart disease AS, MS and MR  Improved with diuresis. Cardio Dr castaneda, echo 55% with grade 3 diastolic dysfunction, recommend continue lisinopril and metoprolol.  Aspirin, Statin.     leukocytosis- possible pneumonia . ID dr Dunham thinks that pneumonia is very unlikely and would stop abx. Continue mgt for CHF.      DM- HISS    Hypertension -  Norvasc 10mg po daily    GERD- Protonix     Dementia and delirium - Aricept 10mg po daily . Namenda 5mg po  daily . Celexa 20mg po daily     OAB- oxybutynin      Disposition.  Patient said she would like to go home.  Patient and doctor said not willing to have any invasive cardiac intervention to repair heart valves. Palliative care. PT.

## 2018-02-26 DIAGNOSIS — E11.9 TYPE 2 DIABETES MELLITUS WITHOUT COMPLICATIONS: ICD-10-CM

## 2018-02-26 DIAGNOSIS — I10 ESSENTIAL (PRIMARY) HYPERTENSION: ICD-10-CM

## 2018-02-26 DIAGNOSIS — I35.0 NONRHEUMATIC AORTIC (VALVE) STENOSIS: ICD-10-CM

## 2018-02-26 DIAGNOSIS — I34.0 NONRHEUMATIC MITRAL (VALVE) INSUFFICIENCY: ICD-10-CM

## 2018-02-26 DIAGNOSIS — F03.90 UNSPECIFIED DEMENTIA WITHOUT BEHAVIORAL DISTURBANCE: ICD-10-CM

## 2018-02-26 DIAGNOSIS — I50.33 ACUTE ON CHRONIC DIASTOLIC (CONGESTIVE) HEART FAILURE: ICD-10-CM

## 2018-02-26 DIAGNOSIS — Z71.89 OTHER SPECIFIED COUNSELING: ICD-10-CM

## 2018-02-26 DIAGNOSIS — I50.9 HEART FAILURE, UNSPECIFIED: ICD-10-CM

## 2018-02-26 LAB
ALBUMIN SERPL ELPH-MCNC: 3.5 G/DL — SIGNIFICANT CHANGE UP (ref 3.3–5.2)
ALP SERPL-CCNC: 106 U/L — SIGNIFICANT CHANGE UP (ref 40–120)
ALT FLD-CCNC: 16 U/L — SIGNIFICANT CHANGE UP
ANION GAP SERPL CALC-SCNC: 14 MMOL/L — SIGNIFICANT CHANGE UP (ref 5–17)
APPEARANCE UR: CLEAR — SIGNIFICANT CHANGE UP
APTT BLD: 37.4 SEC — SIGNIFICANT CHANGE UP (ref 27.5–37.4)
AST SERPL-CCNC: 26 U/L — SIGNIFICANT CHANGE UP
BACTERIA # UR AUTO: ABNORMAL
BILIRUB DIRECT SERPL-MCNC: 0.1 MG/DL — SIGNIFICANT CHANGE UP (ref 0–0.3)
BILIRUB INDIRECT FLD-MCNC: 0.1 MG/DL — LOW (ref 0.2–1)
BILIRUB SERPL-MCNC: 0.2 MG/DL — LOW (ref 0.4–2)
BILIRUB UR-MCNC: NEGATIVE — SIGNIFICANT CHANGE UP
BLD GP AB SCN SERPL QL: SIGNIFICANT CHANGE UP
BUN SERPL-MCNC: 31 MG/DL — HIGH (ref 8–20)
CALCIUM SERPL-MCNC: 8.9 MG/DL — SIGNIFICANT CHANGE UP (ref 8.6–10.2)
CHLORIDE SERPL-SCNC: 97 MMOL/L — LOW (ref 98–107)
CO2 SERPL-SCNC: 27 MMOL/L — SIGNIFICANT CHANGE UP (ref 22–29)
COLOR SPEC: YELLOW — SIGNIFICANT CHANGE UP
CREAT SERPL-MCNC: 0.53 MG/DL — SIGNIFICANT CHANGE UP (ref 0.5–1.3)
DIFF PNL FLD: NEGATIVE — SIGNIFICANT CHANGE UP
EPI CELLS # UR: SIGNIFICANT CHANGE UP
GLUCOSE BLDC GLUCOMTR-MCNC: 160 MG/DL — HIGH (ref 70–99)
GLUCOSE BLDC GLUCOMTR-MCNC: 173 MG/DL — HIGH (ref 70–99)
GLUCOSE BLDC GLUCOMTR-MCNC: 201 MG/DL — HIGH (ref 70–99)
GLUCOSE BLDC GLUCOMTR-MCNC: 239 MG/DL — HIGH (ref 70–99)
GLUCOSE SERPL-MCNC: 176 MG/DL — HIGH (ref 70–115)
GLUCOSE UR QL: NEGATIVE MG/DL — SIGNIFICANT CHANGE UP
HCT VFR BLD CALC: 34.6 % — LOW (ref 37–47)
HGB BLD-MCNC: 11.2 G/DL — LOW (ref 12–16)
INR BLD: 1.17 RATIO — HIGH (ref 0.88–1.16)
KETONES UR-MCNC: NEGATIVE — SIGNIFICANT CHANGE UP
LEUKOCYTE ESTERASE UR-ACNC: ABNORMAL
MCHC RBC-ENTMCNC: 25.8 PG — LOW (ref 27–31)
MCHC RBC-ENTMCNC: 32.4 G/DL — SIGNIFICANT CHANGE UP (ref 32–36)
MCV RBC AUTO: 79.7 FL — LOW (ref 81–99)
MRSA PCR RESULT.: SIGNIFICANT CHANGE UP
NITRITE UR-MCNC: NEGATIVE — SIGNIFICANT CHANGE UP
NT-PROBNP SERPL-SCNC: 753 PG/ML — HIGH (ref 0–300)
PH UR: 6 — SIGNIFICANT CHANGE UP (ref 5–8)
PLATELET # BLD AUTO: 271 K/UL — SIGNIFICANT CHANGE UP (ref 150–400)
POTASSIUM SERPL-MCNC: 4.3 MMOL/L — SIGNIFICANT CHANGE UP (ref 3.5–5.3)
POTASSIUM SERPL-SCNC: 4.3 MMOL/L — SIGNIFICANT CHANGE UP (ref 3.5–5.3)
PREALB SERPL-MCNC: 14 MG/DL — LOW (ref 18–38)
PROT SERPL-MCNC: 6.6 G/DL — SIGNIFICANT CHANGE UP (ref 6.6–8.7)
PROT UR-MCNC: NEGATIVE MG/DL — SIGNIFICANT CHANGE UP
PROTHROM AB SERPL-ACNC: 12.9 SEC — HIGH (ref 9.8–12.7)
RBC # BLD: 4.34 M/UL — LOW (ref 4.4–5.2)
RBC # FLD: 15.5 % — SIGNIFICANT CHANGE UP (ref 11–15.6)
RBC CASTS # UR COMP ASSIST: SIGNIFICANT CHANGE UP /HPF (ref 0–4)
S AUREUS DNA NOSE QL NAA+PROBE: SIGNIFICANT CHANGE UP
SODIUM SERPL-SCNC: 138 MMOL/L — SIGNIFICANT CHANGE UP (ref 135–145)
SP GR SPEC: 1.01 — SIGNIFICANT CHANGE UP (ref 1.01–1.02)
TYPE + AB SCN PNL BLD: SIGNIFICANT CHANGE UP
UROBILINOGEN FLD QL: NEGATIVE MG/DL — SIGNIFICANT CHANGE UP
WBC # BLD: 8.3 K/UL — SIGNIFICANT CHANGE UP (ref 4.8–10.8)
WBC # FLD AUTO: 8.3 K/UL — SIGNIFICANT CHANGE UP (ref 4.8–10.8)
WBC UR QL: SIGNIFICANT CHANGE UP

## 2018-02-26 PROCEDURE — 93325 DOPPLER ECHO COLOR FLOW MAPG: CPT | Mod: 26

## 2018-02-26 PROCEDURE — 99233 SBSQ HOSP IP/OBS HIGH 50: CPT

## 2018-02-26 PROCEDURE — 93320 DOPPLER ECHO COMPLETE: CPT | Mod: 26

## 2018-02-26 PROCEDURE — 71045 X-RAY EXAM CHEST 1 VIEW: CPT | Mod: 26

## 2018-02-26 PROCEDURE — 76376 3D RENDER W/INTRP POSTPROCES: CPT | Mod: 26

## 2018-02-26 PROCEDURE — 99223 1ST HOSP IP/OBS HIGH 75: CPT

## 2018-02-26 PROCEDURE — 99232 SBSQ HOSP IP/OBS MODERATE 35: CPT

## 2018-02-26 PROCEDURE — 93312 ECHO TRANSESOPHAGEAL: CPT | Mod: 26

## 2018-02-26 RX ADMIN — Medication 5 MILLIGRAM(S): at 21:05

## 2018-02-26 RX ADMIN — PANTOPRAZOLE SODIUM 40 MILLIGRAM(S): 20 TABLET, DELAYED RELEASE ORAL at 05:36

## 2018-02-26 RX ADMIN — Medication 2: at 17:32

## 2018-02-26 RX ADMIN — Medication 5 MILLIGRAM(S): at 05:36

## 2018-02-26 RX ADMIN — Medication 81 MILLIGRAM(S): at 11:03

## 2018-02-26 RX ADMIN — Medication 20 MILLIGRAM(S): at 05:35

## 2018-02-26 RX ADMIN — ENOXAPARIN SODIUM 40 MILLIGRAM(S): 100 INJECTION SUBCUTANEOUS at 21:05

## 2018-02-26 RX ADMIN — AMLODIPINE BESYLATE 5 MILLIGRAM(S): 2.5 TABLET ORAL at 17:33

## 2018-02-26 RX ADMIN — SIMVASTATIN 10 MILLIGRAM(S): 20 TABLET, FILM COATED ORAL at 21:05

## 2018-02-26 RX ADMIN — ESCITALOPRAM OXALATE 20 MILLIGRAM(S): 10 TABLET, FILM COATED ORAL at 11:03

## 2018-02-26 RX ADMIN — AMLODIPINE BESYLATE 5 MILLIGRAM(S): 2.5 TABLET ORAL at 05:35

## 2018-02-26 RX ADMIN — MEMANTINE HYDROCHLORIDE 5 MILLIGRAM(S): 10 TABLET ORAL at 11:03

## 2018-02-26 RX ADMIN — DONEPEZIL HYDROCHLORIDE 10 MILLIGRAM(S): 10 TABLET, FILM COATED ORAL at 21:05

## 2018-02-26 RX ADMIN — Medication 5 MILLIGRAM(S): at 17:33

## 2018-02-26 RX ADMIN — LISINOPRIL 10 MILLIGRAM(S): 2.5 TABLET ORAL at 05:35

## 2018-02-26 RX ADMIN — Medication 600 MILLIGRAM(S): at 17:33

## 2018-02-26 NOTE — PHYSICAL THERAPY INITIAL EVALUATION ADULT - PERTINENT HX OF CURRENT PROBLEM, REHAB EVAL
89 yr olf female with known DM, htn, Dementia BIBEMS for SOB given nitrate and CPAP, SOB worsening over last couple days. Admitted for CHF/Cardiomyopathy and found to have severe AS. TAVR recommended by CTS, pt declined.

## 2018-02-26 NOTE — PROGRESS NOTE ADULT - ASSESSMENT
89 yr old female with Shortness of breath  concerning for-     CHF/Cardiomyopathy- Valvular heart disease AS, MS and MR  Improved with diuresis. Cardiology planned MISA today.   Continue aspirin 81mg qd     DM- Insulin sliding scale     Hypertension -  Norvasc 10mg po daily    Dementia and delirium - Aricept 10mg po daily . Namenda 5mg po  daily . Celexa 20mg po daily     OAB- oxybutynin      Disposition.  Patient said she would like to go home.  Patient and doctor said not willing to have any invasive cardiac intervention to repair heart valves. Palliative care. PT.

## 2018-02-26 NOTE — CONSULT NOTE ADULT - PROBLEM SELECTOR RECOMMENDATION 3
Work-up for TAVR discontinued at this time  Will cancel Cardiac Catheterization for now; postpone TAVR  F/U with Cardiologist as Outpatient  MOLST Directive
likely reactive  will trend

## 2018-02-26 NOTE — PHYSICAL THERAPY INITIAL EVALUATION ADULT - GROSSLY INTACT, SENSORY
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

## 2018-02-26 NOTE — CONSULT NOTE ADULT - ASSESSMENT
89YO F with moderate aortic stenosis and Mitral Regurgitation as per MISA results today  Recent difficulty with ARZOLA and chest tightening  Oxygen NC  Dyspnea-CPAP on arrival much better today  Poor functional Status-Has been WC bound standing pivot from bed>CH>Commode  Dementia Nocturnal restlessness, confusion     See Goals of Care conversation

## 2018-02-26 NOTE — CONSULT NOTE ADULT - SUBJECTIVE AND OBJECTIVE BOX
HPI: This is a 89yoF with PMH of DM, HTN, Dementia with [place] deficit. She was admitted after experiencing tightening on her chest and SOB on exertion.   She is awake and alert  is wheelchair bound at home; she has mild dementia, forgetful and confused at night. Good appetite. 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  89 yr old female with known DM, htn, Dementia BIBEMS for SOB given nitrate and CPAP, SOB worsening over last couple days , positive associated with cough, no fever , no phlegm , no chills, no CP, no palpitations , denied edema, denied orthopnea, hasn't seen a cardiologist anita really long time , hasn't been around sick contacts, lives alone at home with aids to help, she has week legs and is essentially wheel chair bound, denied dysuria or skin break or diarrhea (2018 16:40)      PERTINENT PMH REVIEWED: Yes     PAST MEDICAL & SURGICAL HISTORY:  Overactive bladder  Dementia  High cholesterol  HTN (hypertension)  DM (diabetes mellitus)      SOCIAL HISTORY:  EtOH    No                                    Drugs    No                                    sm  nonsmoker                                    Admitted from: home  with aides  HCP: Daughter: Azul Nielson (c) [phone]    FAMILY HISTORY:  No pertinent family history in first degree relatives    No Known Allergies    Baseline ADLs (prior to admission):   Dependent      Present Symptoms:   Dyspnea:  1- 2 3 Wearing Nasal O2  Nausea/Vomiting:  No  Anxiety:  Yes mild during day with daughters at bedside  Depression: Yes   Fatigue: Yes   Loss of appetite:  No    Pain: Denies            Character-            Duration-            Effect-            Factors-            Frequency-            Location-            Severity-    Review of Systems: Reviewed                       All others negative    MEDICATIONS  (STANDING):  amLODIPine   Tablet 5 milliGRAM(s) Oral two times a day  aspirin  chewable 81 milliGRAM(s) Oral daily  dextrose 5%. 1000 milliLiter(s) (50 mL/Hr) IV Continuous <Continuous>  dextrose 50% Injectable 12.5 Gram(s) IV Push once  dextrose 50% Injectable 25 Gram(s) IV Push once  dextrose 50% Injectable 25 Gram(s) IV Push once  donepezil 10 milliGRAM(s) Oral at bedtime  enoxaparin Injectable 40 milliGRAM(s) SubCutaneous daily  escitalopram 20 milliGRAM(s) Oral daily  furosemide   Injectable 20 milliGRAM(s) IV Push daily  gemfibrozil 600 milliGRAM(s) Oral two times a day before meals  insulin lispro (HumaLOG) corrective regimen sliding scale   SubCutaneous three times a day before meals  lisinopril 10 milliGRAM(s) Oral daily  memantine 5 milliGRAM(s) Oral daily  oxybutynin 5 milliGRAM(s) Oral three times a day  pantoprazole    Tablet 40 milliGRAM(s) Oral before breakfast  simvastatin 10 milliGRAM(s) Oral at bedtime    MEDICATIONS  (PRN):  dextrose Gel 1 Dose(s) Oral once PRN Blood Glucose LESS THAN 70 milliGRAM(s)/deciliter  glucagon  Injectable 1 milliGRAM(s) IntraMuscular once PRN Glucose LESS THAN 70 milligrams/deciliter      PHYSICAL EXAM:    Vital Signs Last 24 Hrs  T(C): 37.3 (2018 15:44), Max: 37.3 (2018 10:47)  T(F): 99.1 (2018 15:44), Max: 99.2 (2018 10:47)  HR: 57 (2018 15:44) (54 - 68)  BP: 138/59 (2018 15:44) (98/60 - 138/59)  BP(mean): --  RR: 18 (2018 15:44) (18 - 18)  SpO2: 98% (2018 15:44) (94% - 100%)    General: alert  oriented x __2-3__     HEENT:  dry mouth      Lungs: comfortable at rest no wheezing      CV: normal     GI: normal                constipation  denies    : normal      MSK: weakness  edema trace           bedbound/wheelchair bound    Skin: normal   no rash    LABS:                        11.2   8.3   )-----------( 271      ( 2018 05:51 )             34.6     02-26    138  |  97<L>  |  31.0<H>  ----------------------------<  176<H>  4.3   |  27.0  |  0.53    Ca    8.9      2018 05:51    TPro  6.6  /  Alb  3.5  /  TBili  0.2<L>  /  DBili  0.1  /  AST  26  /  ALT  16  /  AlkPhos  106  -    PT/INR - ( 2018 05:51 )   PT: 12.9 sec;   INR: 1.17 ratio       PTT - ( 2018 05:51 )  PTT:37.4 sec  Urinalysis Basic - ( 2018 00:50 )    Color: Yellow / Appearance: Clear / S.010 / pH: x  Gluc: x / Ketone: Negative  / Bili: Negative / Urobili: Negative mg/dL   Blood: x / Protein: Negative mg/dL / Nitrite: Negative   Leuk Esterase: Trace / RBC: 0-2 /HPF / WBC 3-5   Sq Epi: x / Non Sq Epi: Occasional / Bacteria: Occasional    I&O's Summary    2018 07:  -  2018 07:00  --------------------------------------------------------  IN: 820 mL / OUT: 650 mL / NET: 170 mL    2018 07:  -  2018 16:36  --------------------------------------------------------  IN: 0 mL / OUT: 450 mL / NET: -450 mL    RADIOLOGY & ADDITIONAL STUDIES:    ADVANCE DIRECTIVES:   DNR  NO  Completed on:                     TOVA  YES    Completed on: 2018  Living Will  NO   Completed on:

## 2018-02-26 NOTE — CONSULT NOTE ADULT - CONSULT REASON
possible PNA
Aortic Stenosis> TAVR
CHF
Deconditioning from chronic illnee  Multiple Medical Co-morbidities

## 2018-02-26 NOTE — PHYSICAL THERAPY INITIAL EVALUATION ADULT - CRITERIA FOR SKILLED THERAPEUTIC INTERVENTIONS
Home with RW, 24/7 supervision/Assist, Home PT/anticipated discharge recommendation/impairments found

## 2018-02-26 NOTE — PROGRESS NOTE ADULT - SUBJECTIVE AND OBJECTIVE BOX
CC: SOB .  Cardiomyopathy from valvular heart disease AS, MR , MS.  Cardiology is planning on MISA and possible Heart cath with CTA to eval heart valves.   HPI:  89 yr olf female with known DM, htn, Dementia BIBEMS for SOB given nitrate and CPAP, SOB worsening over last couple days , positive associated with cough, no fever , no phlegm , no chills, no CP, no palpitations , denied edema, denied orthopnea, hasn't seen a cardiologist anita really long time , hasn't been around sick contacts, lives alone at home with aids to help, she has week legs and is essentially wheel chair bound, denied dysuria or skin break or diarrhea (2018 16:40)    REVIEW OF SYSTEMS:    Patient denied fever, chills, abdominal pain, nausea, vomiting, cough, shortness of breath, chest pain or palpitations    Vital Signs Last 24 Hrs  T(C): 37.3 (2018 10:47), Max: 37.3 (2018 10:47)  T(F): 99.2 (2018 10:47), Max: 99.2 (2018 10:47)  HR: 62 (2018 11:04) (54 - 68)  BP: 98/60 (2018 11:04) (98/60 - 142/54)  BP(mean): --  RR: 18 (2018 11:04) (18 - 18)  SpO2: 94% (2018 11:04) (94% - 100%)I&O's Summary    2018 07:01  -  2018 07:00  --------------------------------------------------------  IN: 820 mL / OUT: 650 mL / NET: 170 mL      PHYSICAL EXAM:  GENERAL: NAD  HEENT: PERRL, +EOMI, anicteric, no [place]  NECK: Supple, No JVD   CHEST/LUNG: CTA bilaterally; Normal effort  HEART: S1S2 Normal intensity,   ABDOMEN: Soft, BS Normoactive, NT, ND, no HSM noted  EXTREMITIES:  2+ radial and DP pulses noted, no clubbing, cyanosis, or edema noted. Limited mobility   SKIN: No rashes or lesions noted  NEURO: A&Ox3, no focal deficits noted, CN II-XII intact  PSYCH: normal mood and affect; insight/judgement appropriate  LABS:                        11.2   8.3   )-----------( 271      ( 2018 05:51 )             34.6         138  |  97<L>  |  31.0<H>  ----------------------------<  176<H>  4.3   |  27.0  |  0.53    Ca    8.9      2018 05:51    TPro  6.6  /  Alb  3.5  /  TBili  0.2<L>  /  DBili  0.1  /  AST  26  /  ALT  16  /  AlkPhos  106      PT/INR - ( 2018 05:51 )   PT: 12.9 sec;   INR: 1.17 ratio         PTT - ( 2018 05:51 )  PTT:37.4 sec  Urinalysis Basic - ( 2018 00:50 )    Color: Yellow / Appearance: Clear / S.010 / pH: x  Gluc: x / Ketone: Negative  / Bili: Negative / Urobili: Negative mg/dL   Blood: x / Protein: Negative mg/dL / Nitrite: Negative   Leuk Esterase: Trace / RBC: 0-2 /HPF / WBC 3-5   Sq Epi: x / Non Sq Epi: Occasional / Bacteria: Occasional      RADIOLOGY & ADDITIONAL TESTS:    MEDICATIONS:  MEDICATIONS  (STANDING):  amLODIPine   Tablet 5 milliGRAM(s) Oral two times a day  aspirin  chewable 81 milliGRAM(s) Oral daily  dextrose 5%. 1000 milliLiter(s) (50 mL/Hr) IV Continuous <Continuous>  dextrose 50% Injectable 12.5 Gram(s) IV Push once  dextrose 50% Injectable 25 Gram(s) IV Push once  dextrose 50% Injectable 25 Gram(s) IV Push once  donepezil 10 milliGRAM(s) Oral at bedtime  enoxaparin Injectable 40 milliGRAM(s) SubCutaneous daily  escitalopram 20 milliGRAM(s) Oral daily  furosemide   Injectable 20 milliGRAM(s) IV Push daily  gemfibrozil 600 milliGRAM(s) Oral two times a day before meals  insulin lispro (HumaLOG) corrective regimen sliding scale   SubCutaneous three times a day before meals  lisinopril 10 milliGRAM(s) Oral daily  memantine 5 milliGRAM(s) Oral daily  oxybutynin 5 milliGRAM(s) Oral three times a day  pantoprazole    Tablet 40 milliGRAM(s) Oral before breakfast  simvastatin 10 milliGRAM(s) Oral at bedtime    MEDICATIONS  (PRN):  dextrose Gel 1 Dose(s) Oral once PRN Blood Glucose LESS THAN 70 milliGRAM(s)/deciliter  glucagon  Injectable 1 milliGRAM(s) IntraMuscular once PRN Glucose LESS THAN 70 milligrams/deciliter

## 2018-02-26 NOTE — CHART NOTE - NSCHARTNOTEFT_GEN_A_CORE
As per Dr. Fierro, who states that he had a long conversation with the patients daughter. At this point the family does not want to proceed with TAVR and wishes to proceed with medical management of her aortic stenosis.    Attending CT Surgeon Dr. Lopez has been notified.    We are available to re-consult if the need arises.    Thank you.

## 2018-02-26 NOTE — CONSULT NOTE ADULT - ATTENDING COMMENTS
COUNSELING:    Face to face meeting to discuss Advanced Care Planning - Time Spent _20_____ Minutes.  *****See goals of care note.    More than 50% time spent in counseling and coordinating care. __60__ Minutes.     Thank you for the opportunity to assist with the care of this patient.   [facility] Consult Service [phone].    Patient and family opting for conservative treatment at this time  Cancelling Cardiac Cath tomorrow, TAVR will not be done  Hospice Referral for home support

## 2018-02-26 NOTE — CONSULT NOTE ADULT - PROBLEM SELECTOR RECOMMENDATION 2
as above
continue diuresis  continue ACE
Conservative Medical management has been   decided based on risks and benefits  gentle diuresis

## 2018-02-26 NOTE — CONSULT NOTE ADULT - PROBLEM SELECTOR RECOMMENDATION 4
Continue Namenda, Celexa  Provide additional medication for Sundowning behaviors  Risperdal 0.25 mg PO Q4 prn at night

## 2018-02-26 NOTE — CONSULT NOTE ADULT - PROBLEM SELECTOR PROBLEM 2
Bilateral pulmonary infiltrates on CXR
Acute on chronic diastolic congestive heart failure, NYHA class 3
Acute on chronic diastolic congestive heart failure, NYHA class 3

## 2018-02-26 NOTE — PROGRESS NOTE ADULT - SUBJECTIVE AND OBJECTIVE BOX
[facility]                                                        Office: [address] [address]                                                       Telephone: [phone]. Fax:[phone]                                                                             PROGRESS NOTE    Subjective: Patient was seen by me after the MISA.  Dr Anaya asked me to see the patient for general cardiology.     Review of symptoms: Cardiac:  No chest pain. No dyspnea. No palpitations. +Phlegm  Respiratory:no cough. No dyspnea  Gastrointestinal: No diarrhea. No abdominal pain. No bleeding.       	  Vitals:  T(C): 37.3 (02-26-18 @ 15:44), Max: 37.3 (02-26-18 @ 10:47)  HR: 57 (02-26-18 @ 15:44) (54 - 68)  BP: 138/59 (02-26-18 @ 15:44) (98/60 - 138/59)  RR: 18 (02-26-18 @ 15:44) (18 - 18)  SpO2: 98% (02-26-18 @ 15:44) (94% - 100%)  Wt(kg): --  I&O's Summary    25 Feb 2018 07:01  -  26 Feb 2018 07:00  --------------------------------------------------------  IN: 820 mL / OUT: 650 mL / NET: 170 mL    26 Feb 2018 07:01  -  26 Feb 2018 17:16  --------------------------------------------------------  IN: 0 mL / OUT: 450 mL / NET: -450 mL      Weight (kg): 78.471 (02-26 @ 11:27)    PHYSICAL EXAM:  Appearance: Comfortable. No acute distress  HEENT:  Head and neck: Atraumatic. Normocephalic.  Normal oral mucosa, PERRL, Neck is supple. No JVD, No carotid bruit. + Hard of hearing  Neurologic: Alert and awake,Grossly nonfocal.  Lymphatic: No cervical lymphadenopathy  Cardiovascular: Normal S1 S2, LOIDA 3/6, S2 is present, apical holosystolic murmur  Respiratory: Lungs clear to auscultation except for fine basal crackles  Gastrointestinal:  Soft, Non-tender, + BS  Lower Extremities: No edema  Psychiatry: Patient is calm. No agitation. Mood & affect appropriate  Skin: No rashes/ ecchymoses/cyanosis/ulcers visualized on the face, hands or feet.    CURRENT MEDICATIONS:  amLODIPine   Tablet 5 milliGRAM(s) Oral two times a day  furosemide   Injectable 20 milliGRAM(s) IV Push daily  lisinopril 10 milliGRAM(s) Oral daily    donepezil  escitalopram  memantine  pantoprazole    Tablet  dextrose 50% Injectable  dextrose 50% Injectable  dextrose 50% Injectable  gemfibrozil  insulin lispro (HumaLOG) corrective regimen sliding scale  simvastatin  aspirin  chewable  dextrose 5%.  enoxaparin Injectable  oxybutynin      LABS:	 	  CARDIAC MARKERS ( 26 Feb 2018 05:51 )  x     / x     / x     / x     / x      p-BNP 26 Feb 2018 05:51: 753 pg/mL, CARDIAC MARKERS ( 24 Feb 2018 07:27 )  x     / 0.01 ng/mL / x     / x     / x      p-BNP 24 Feb 2018 07:27: x    , CARDIAC MARKERS ( 23 Feb 2018 19:24 )  x     / 0.02 ng/mL / x     / x     / x      p-BNP 23 Feb 2018 19:24: x                                11.2   8.3   )-----------( 271      ( 26 Feb 2018 05:51 )             34.6     02-26    138  |  97<L>  |  31.0<H>  ----------------------------<  176<H>  4.3   |  27.0  |  0.53    Ca    8.9      26 Feb 2018 05:51    TPro  6.6  /  Alb  3.5  /  TBili  0.2<L>  /  DBili  0.1  /  AST  26  /  ALT  16  /  AlkPhos  106  02-26    proBNP: Serum Pro-Brain Natriuretic Peptide: 753 pg/mL (02-26 @ 05:51)  Serum Pro-Brain Natriuretic Peptide: 1550 pg/mL (02-23 @ 12:29)    Lipid Profile:   HgA1c: Hemoglobin A1C, Whole Blood: 6.8 %  TSH:     < from: TTE Echo w/Cont Complete (02.24.18 @ 11:38) >  Summary:   1. Technically limited study.   2. Endocardial visualization was enhanced with intravenous echo contrast.   3. Normal global left ventricular systolic function.   4. Left ventricular ejection fraction, by visual estimation, is 55%.   5. Spectral Doppler shows restrictive pattern of left ventricular  myocardial filling (Grade III diastolic dysfunction).   6. Normal right ventricular size and function.   7. Moderate to severe aortic valve stenosis.   8. Thickening and calcification of the anterior and posterior mitral   valve leaflets.   9. Moderate mitral valve regurgitation.  10. Mitral valve mean gradient is 5.0 mmHg consistent with moderate   mitral stenosis.  11. Moderate mitral annular calcification.  12. Mild-moderate tricuspid regurgitation.  13. Estimated pulmonary artery systolic pressure is 55.6 mmHg assuming a   right atrial pressure of 8 mmHg, which is consistent with moderate   pulmonary hypertension.    MD Greer Electronically signed on 2/24/2018 at 4:27:46 PM              *** Final ***        FRANK RITTER   This document has been electronically signed. Feb 24 2018 11:38AM    < end of copied text >

## 2018-02-26 NOTE — PROGRESS NOTE ADULT - SUBJECTIVE AND OBJECTIVE BOX
Subjective:  "I'm just waiting for that camera test"  OOB chair, watching TV    Tele: SR  60s    T(C): 37 (02-26-18 @ 05:31), Max: 37 (02-26-18 @ 05:31)  HR: 66 (02-26-18 @ 05:31) (49 - 66)  BP: 129/57 (02-26-18 @ 05:31) (112/46 - 142/54)  RR: 18 (02-26-18 @ 05:31) (18 - 20)  SpO2: 100% (02-26-18 @ 05:31) (100% - 100%)    LVEF: 55%      02-26    138  |  97<L>  |  31.0<H>  ----------------------------<  176<H>  4.3   |  27.0  |  0.53    Ca    8.9      26 Feb 2018 05:51    TPro  6.6  /  Alb  3.5  /  TBili  0.2<L>  /  DBili  0.1  /  AST  26  /  ALT  16  /  AlkPhos  106  02-26                               11.2   8.3   )-----------( 271      ( 26 Feb 2018 05:51 )             34.6        PT/INR - ( 26 Feb 2018 05:51 )   PT: 12.9 sec;   INR: 1.17 ratio         PTT - ( 26 Feb 2018 05:51 )  PTT:37.4 sec    CAPILLARY BLOOD GLUCOSE      POCT Blood Glucose.: 201 mg/dL (26 Feb 2018 08:10)  POCT Blood Glucose.: 179 mg/dL (25 Feb 2018 21:51)  POCT Blood Glucose.: 207 mg/dL (25 Feb 2018 17:05)  POCT Blood Glucose.: 231 mg/dL (25 Feb 2018 12:44)           CXR:    There are linear subsegmental atelectasis in mid lung fields.  There is   improvement in course reticular opacities. There is elevation of the   right hemidiaphragm. Cardiac and mediastinal structures are stable.      There are degenerative changes.         Assessment    Neuro: alert, pleasant, oriented to self, place, general events    Pulm: diminshed at bilateral bases, neg cough    CV: S1 S2  RRR   III/VI  LOIDA across precordium    Abd: obese soft non tender  +  BM  this am    Extremities: no edema  denies calf pain      MEDICATIONS  (STANDING):  amLODIPine   Tablet 5 milliGRAM(s) Oral two times a day  aspirin  chewable 81 milliGRAM(s) Oral daily  dextrose 5%. 1000 milliLiter(s) (50 mL/Hr) IV Continuous <Continuous>  dextrose 50% Injectable 12.5 Gram(s) IV Push once  dextrose 50% Injectable 25 Gram(s) IV Push once  dextrose 50% Injectable 25 Gram(s) IV Push once  donepezil 10 milliGRAM(s) Oral at bedtime  enoxaparin Injectable 40 milliGRAM(s) SubCutaneous daily  escitalopram 20 milliGRAM(s) Oral daily  furosemide   Injectable 20 milliGRAM(s) IV Push daily  gemfibrozil 600 milliGRAM(s) Oral two times a day before meals  insulin lispro (HumaLOG) corrective regimen sliding scale   SubCutaneous three times a day before meals  lisinopril 10 milliGRAM(s) Oral daily  memantine 5 milliGRAM(s) Oral daily  oxybutynin 5 milliGRAM(s) Oral three times a day  pantoprazole    Tablet 40 milliGRAM(s) Oral before breakfast  simvastatin 10 milliGRAM(s) Oral at bedtime       PAST MEDICAL & SURGICAL HISTORY:  Overactive bladder  Dementia  High cholesterol  HTN (hypertension)  DM (diabetes mellitus)

## 2018-02-26 NOTE — PROGRESS NOTE ADULT - ASSESSMENT
89 yr old female with Shortness of breath  concerning for-     CHF/Cardiomyopathy- from possible valvular heart disease AS, MS and MR  Improved with diuresis. Cardio Dr castaneda, echo 55% with grade 3 diastolic dysfunction, recommend continue lisinopril and metoprolol.  Aspirin, Statin.

## 2018-02-26 NOTE — PHYSICAL THERAPY INITIAL EVALUATION ADULT - ADDITIONAL COMMENTS
Pt lives in a private home with her live in aide. 3 stairs to enter with handrails, no steps inside. Pt required Min A PTA and performed stand pivot transfers only with Aide's assist at home. Pt has RW, W/C and commode at home.

## 2018-02-27 DIAGNOSIS — I47.2 VENTRICULAR TACHYCARDIA: ICD-10-CM

## 2018-02-27 LAB
ABO RH CONFIRMATION: SIGNIFICANT CHANGE UP
GLUCOSE BLDC GLUCOMTR-MCNC: 138 MG/DL — HIGH (ref 70–99)
GLUCOSE BLDC GLUCOMTR-MCNC: 185 MG/DL — HIGH (ref 70–99)
GLUCOSE BLDC GLUCOMTR-MCNC: 267 MG/DL — HIGH (ref 70–99)
GLUCOSE BLDC GLUCOMTR-MCNC: 268 MG/DL — HIGH (ref 70–99)
GLUCOSE BLDC GLUCOMTR-MCNC: 280 MG/DL — HIGH (ref 70–99)

## 2018-02-27 PROCEDURE — 99232 SBSQ HOSP IP/OBS MODERATE 35: CPT

## 2018-02-27 PROCEDURE — 99233 SBSQ HOSP IP/OBS HIGH 50: CPT

## 2018-02-27 PROCEDURE — 93880 EXTRACRANIAL BILAT STUDY: CPT | Mod: 26

## 2018-02-27 RX ORDER — CARVEDILOL PHOSPHATE 80 MG/1
3.12 CAPSULE, EXTENDED RELEASE ORAL EVERY 12 HOURS
Qty: 0 | Refills: 0 | Status: DISCONTINUED | OUTPATIENT
Start: 2018-02-27 | End: 2018-02-28

## 2018-02-27 RX ADMIN — Medication 6: at 13:30

## 2018-02-27 RX ADMIN — Medication 600 MILLIGRAM(S): at 06:17

## 2018-02-27 RX ADMIN — PANTOPRAZOLE SODIUM 40 MILLIGRAM(S): 20 TABLET, DELAYED RELEASE ORAL at 05:39

## 2018-02-27 RX ADMIN — Medication 5 MILLIGRAM(S): at 22:28

## 2018-02-27 RX ADMIN — Medication 2: at 08:57

## 2018-02-27 RX ADMIN — CARVEDILOL PHOSPHATE 3.12 MILLIGRAM(S): 80 CAPSULE, EXTENDED RELEASE ORAL at 17:52

## 2018-02-27 RX ADMIN — MEMANTINE HYDROCHLORIDE 5 MILLIGRAM(S): 10 TABLET ORAL at 13:28

## 2018-02-27 RX ADMIN — Medication 81 MILLIGRAM(S): at 13:28

## 2018-02-27 RX ADMIN — ESCITALOPRAM OXALATE 20 MILLIGRAM(S): 10 TABLET, FILM COATED ORAL at 13:29

## 2018-02-27 RX ADMIN — Medication 5 MILLIGRAM(S): at 05:39

## 2018-02-27 RX ADMIN — ENOXAPARIN SODIUM 40 MILLIGRAM(S): 100 INJECTION SUBCUTANEOUS at 22:28

## 2018-02-27 RX ADMIN — LISINOPRIL 10 MILLIGRAM(S): 2.5 TABLET ORAL at 05:39

## 2018-02-27 RX ADMIN — Medication 600 MILLIGRAM(S): at 17:52

## 2018-02-27 RX ADMIN — Medication 20 MILLIGRAM(S): at 05:39

## 2018-02-27 RX ADMIN — SIMVASTATIN 10 MILLIGRAM(S): 20 TABLET, FILM COATED ORAL at 22:28

## 2018-02-27 RX ADMIN — Medication 5 MILLIGRAM(S): at 13:29

## 2018-02-27 RX ADMIN — DONEPEZIL HYDROCHLORIDE 10 MILLIGRAM(S): 10 TABLET, FILM COATED ORAL at 22:28

## 2018-02-27 NOTE — PROGRESS NOTE ADULT - ASSESSMENT
PLan:  Daughter has a meeting with Hospice to discuss arranging  Home support    She will not be pursuing any procedural  interventions at this time  Returning home with  support of Aides and Hospice    Setting up O2 at home-and other equipment as needed

## 2018-02-27 NOTE — PROGRESS NOTE ADULT - ATTENDING COMMENTS
COUNSELING:    Face to face meeting to discuss Advanced Care Planning - Time Spent ______ Minutes.  See goals of care note.    More than 50% time spent in counseling and coordinating care. __20____ Minutes.     Thank you for the opportunity to assist with the care of this patient.   [facility] Consult Service [phone].

## 2018-02-27 NOTE — GOALS OF CARE CONVERSATION - PERSONAL ADVANCE DIRECTIVE - CONVERSATION DETAILS
late entry  met with daughter and pt hospice program and services explained family agreeable  pt approved for home hospice equipment tonight  cc to restart  medicaid hha  . if medically ready anticipate d/c  with hospice 2/28
First the Cardiologist provided a lengthy explanation of MISA results, listing the risks and benefits of  invasive procedures (Cardiac catheterization and TAVR)    Highlighted the fact that patient is not actively ambulating anymore-uses WC and stands to pivot to bed or   commode  24hr aide.    Patient with mild Dementia and is [place]. She understood what was medically shared, but was not able to make up her mind  about having procedures done. Daughters questioned MD and myself at length, asking patient several times  if she did or didn't want to go ahead with more diagnostic procedures.    It was decided that Aortic Stenosis was moderate, TAVR could be postponed at this time, reassess if symptoms  become more incapacitating.    Hospice support was described family agreeable for Hospice Evaluation for more home support    Consent for MOLST was verbally obtained, patient herself made a comment regarding "How long am I going to live?"  would not want to be sick and be managed on machines

## 2018-02-27 NOTE — PROGRESS NOTE ADULT - SUBJECTIVE AND OBJECTIVE BOX
[facility]                                                        Office: [address] [address]                                                       Telephone: [phone]. Fax:[phone]                                                                             PROGRESS NOTE    Subjective: Patient is feeling fine, no complaints.     Review of symptoms: Cardiac:  No chest pain. No dyspnea. No palpitations. +Phlegm  Respiratory:no cough. No dyspnea  Gastrointestinal: No diarrhea. No abdominal pain. No bleeding.       	  Vital Signs Last 24 Hrs  T(C): 36.9 (02-27-18 @ 09:17), Max: 37.3 (02-26-18 @ 15:44)  T(F): 98.4 (02-27-18 @ 09:17), Max: 99.1 (02-26-18 @ 15:44)  HR: 79 (02-27-18 @ 09:17) (57 - 79)  BP: 113/34 (02-27-18 @ 09:17) (113/34 - 138/59)  BP(mean): --  RR: 17 (02-27-18 @ 09:17) (16 - 18)  SpO2: 99% (02-27-18 @ 05:34) (97% - 99%)    Weight (kg): 78.471 (02-26 @ 11:27)    PHYSICAL EXAM:  Appearance: Comfortable. No acute distress  HEENT:  Head and neck: Atraumatic. Normocephalic.  Normal oral mucosa, PERRL, Neck is supple. No JVD, No carotid bruit. + Hard of hearing  Neurologic: Alert and awake,Grossly nonfocal.  Lymphatic: No cervical lymphadenopathy  Cardiovascular: Normal S1 S2, LOIDA 3/6, S2 is present, apical holosystolic murmur  Respiratory: Lungs clear to auscultation except for fine basal crackles  Gastrointestinal:  Soft, Non-tender, + BS  Lower Extremities: No edema  Psychiatry: Patient is calm. No agitation. Mood & affect appropriate  Skin: No rashes/ ecchymoses/cyanosis/ulcers visualized on the face, hands or feet.    CURRENT MEDICATIONS:  MEDICATIONS  (STANDING):  aspirin  chewable 81 milliGRAM(s) Oral daily  dextrose 5%. 1000 milliLiter(s) (50 mL/Hr) IV Continuous <Continuous>  dextrose 50% Injectable 12.5 Gram(s) IV Push once  dextrose 50% Injectable 25 Gram(s) IV Push once  dextrose 50% Injectable 25 Gram(s) IV Push once  donepezil 10 milliGRAM(s) Oral at bedtime  enoxaparin Injectable 40 milliGRAM(s) SubCutaneous daily  escitalopram 20 milliGRAM(s) Oral daily  furosemide   Injectable 20 milliGRAM(s) IV Push daily  gemfibrozil 600 milliGRAM(s) Oral two times a day before meals  insulin lispro (HumaLOG) corrective regimen sliding scale   SubCutaneous three times a day before meals  lisinopril 10 milliGRAM(s) Oral daily  memantine 5 milliGRAM(s) Oral daily  oxybutynin 5 milliGRAM(s) Oral three times a day  pantoprazole    Tablet 40 milliGRAM(s) Oral before breakfast  simvastatin 10 milliGRAM(s) Oral at bedtime      LABS:	 	  CARDIAC MARKERS ( 26 Feb 2018 05:51 )  x     / x     / x     / x     / x      p-BNP 26 Feb 2018 05:51: 753 pg/mL, CARDIAC MARKERS ( 24 Feb 2018 07:27 )  x     / 0.01 ng/mL / x     / x     / x      p-BNP 24 Feb 2018 07:27: x    , CARDIAC MARKERS ( 23 Feb 2018 19:24 )  x     / 0.02 ng/mL / x     / x     / x      p-BNP 23 Feb 2018 19:24: x                              11.2   8.3   )-----------( 271      ( 26 Feb 2018 05:51 )             34.6   N=x    ; L=x        26 Feb 2018 05:51    138    |  97     |  31.0   ----------------------------<  176    4.3     |  27.0   |  0.53     Ca    8.9        26 Feb 2018 05:51    TPro  6.6    /  Alb  3.5    /  TBili  0.2    /  DBili  0.1    /  AST  26     /  ALT  16     /  AlkPhos  106    26 Feb 2018 05:51      Hepatic panel: 26 Feb 2018 05:51  6.6   | 3.5                            0.2   | 0.2  /0.1                            26    | 16                                /106  \par                                                         proBNP: Serum Pro-Brain Natriuretic Peptide: 753 pg/mL (02-26 @ 05:51)  Serum Pro-Brain Natriuretic Peptide: 1550 pg/mL (02-23 @ 12:29)    Lipid Profile:   HgA1c: Hemoglobin A1C, Whole Blood: 6.8 %  TSH:     < from: TTE Echo w/Cont Complete (02.24.18 @ 11:38) >  Summary:   1. Technically limited study.   2. Endocardial visualization was enhanced with intravenous echo contrast.   3. Normal global left ventricular systolic function.   4. Left ventricular ejection fraction, by visual estimation, is 55%.   5. Spectral Doppler shows restrictive pattern of left ventricular  myocardial filling (Grade III diastolic dysfunction).   6. Normal right ventricular size and function.   7. Moderate to severe aortic valve stenosis.   8. Thickening and calcification of the anterior and posterior mitral   valve leaflets.   9. Moderate mitral valve regurgitation.  10. Mitral valve mean gradient is 5.0 mmHg consistent with moderate   mitral stenosis.  11. Moderate mitral annular calcification.  12. Mild-moderate tricuspid regurgitation.  13. Estimated pulmonary artery systolic pressure is 55.6 mmHg assuming a   right atrial pressure of 8 mmHg, which is consistent with moderate   pulmonary hypertension.    MD Greer Electronically signed on 2/24/2018 at 4:27:46 PM              *** Final ***        FRANK RITTER   This document has been electronically signed. Feb 24 2018 11:38AM    < end of copied text > [facility]                                                        Office: [address] [address]                                                       Telephone: [phone]. Fax:[phone]                                                                             PROGRESS NOTE    Subjective: Patient is feeling fine, no complaints.     Review of symptoms: Cardiac:  No chest pain. No dyspnea. No palpitations. +Phlegm  Respiratory:no cough. No dyspnea  Gastrointestinal: No diarrhea. No abdominal pain. No bleeding.       	  Vital Signs Last 24 Hrs  T(C): 36.9 (02-27-18 @ 09:17), Max: 37.3 (02-26-18 @ 15:44)  T(F): 98.4 (02-27-18 @ 09:17), Max: 99.1 (02-26-18 @ 15:44)  HR: 79 (02-27-18 @ 09:17) (57 - 79)  BP: 113/34 (02-27-18 @ 09:17) (113/34 - 138/59)  BP(mean): --  RR: 17 (02-27-18 @ 09:17) (16 - 18)  SpO2: 99% (02-27-18 @ 05:34) (97% - 99%)    Weight (kg): 78.471 (02-26 @ 11:27)    PHYSICAL EXAM:  Appearance: Comfortable. No acute distress  HEENT:  Head and neck: Atraumatic. Normocephalic.  Normal oral mucosa, PERRL, Neck is supple. No JVD, No carotid bruit. + Hard of hearing  Neurologic: Alert and awake,Grossly nonfocal.  Lymphatic: No cervical lymphadenopathy  Cardiovascular: Normal S1 S2, LOIDA 3/6, S2 is present, apical holosystolic murmur  Respiratory: Lungs clear to auscultation except for fine basal crackles  Gastrointestinal:  Soft, Non-tender, + BS  Lower Extremities: No edema  Psychiatry: Patient is calm. No agitation. Mood & affect appropriate  Skin: No rashes/ ecchymoses/cyanosis/ulcers visualized on the face, hands or feet.  Telemetry- showed 3 beat run of wide complex tachycardia.    CURRENT MEDICATIONS:  MEDICATIONS  (STANDING):  aspirin  chewable 81 milliGRAM(s) Oral daily  dextrose 5%. 1000 milliLiter(s) (50 mL/Hr) IV Continuous <Continuous>  dextrose 50% Injectable 12.5 Gram(s) IV Push once  dextrose 50% Injectable 25 Gram(s) IV Push once  dextrose 50% Injectable 25 Gram(s) IV Push once  donepezil 10 milliGRAM(s) Oral at bedtime  enoxaparin Injectable 40 milliGRAM(s) SubCutaneous daily  escitalopram 20 milliGRAM(s) Oral daily  furosemide   Injectable 20 milliGRAM(s) IV Push daily  gemfibrozil 600 milliGRAM(s) Oral two times a day before meals  insulin lispro (HumaLOG) corrective regimen sliding scale   SubCutaneous three times a day before meals  lisinopril 10 milliGRAM(s) Oral daily  memantine 5 milliGRAM(s) Oral daily  oxybutynin 5 milliGRAM(s) Oral three times a day  pantoprazole    Tablet 40 milliGRAM(s) Oral before breakfast  simvastatin 10 milliGRAM(s) Oral at bedtime      LABS:	 	  CARDIAC MARKERS ( 26 Feb 2018 05:51 )  x     / x     / x     / x     / x      p-BNP 26 Feb 2018 05:51: 753 pg/mL, CARDIAC MARKERS ( 24 Feb 2018 07:27 )  x     / 0.01 ng/mL / x     / x     / x      p-BNP 24 Feb 2018 07:27: x    , CARDIAC MARKERS ( 23 Feb 2018 19:24 )  x     / 0.02 ng/mL / x     / x     / x      p-BNP 23 Feb 2018 19:24: x                              11.2   8.3   )-----------( 271      ( 26 Feb 2018 05:51 )             34.6   N=x    ; L=x        26 Feb 2018 05:51    138    |  97     |  31.0   ----------------------------<  176    4.3     |  27.0   |  0.53     Ca    8.9        26 Feb 2018 05:51    TPro  6.6    /  Alb  3.5    /  TBili  0.2    /  DBili  0.1    /  AST  26     /  ALT  16     /  AlkPhos  106    26 Feb 2018 05:51      Hepatic panel: 26 Feb 2018 05:51  6.6   | 3.5                            0.2   | 0.2  /0.1                            26    | 16                                /106  \par                                                         proBNP: Serum Pro-Brain Natriuretic Peptide: 753 pg/mL (02-26 @ 05:51)  Serum Pro-Brain Natriuretic Peptide: 1550 pg/mL (02-23 @ 12:29)    Lipid Profile:   HgA1c: Hemoglobin A1C, Whole Blood: 6.8 %  TSH:     < from: TTE Echo w/Cont Complete (02.24.18 @ 11:38) >  Summary:   1. Technically limited study.   2. Endocardial visualization was enhanced with intravenous echo contrast.   3. Normal global left ventricular systolic function.   4. Left ventricular ejection fraction, by visual estimation, is 55%.   5. Spectral Doppler shows restrictive pattern of left ventricular  myocardial filling (Grade III diastolic dysfunction).   6. Normal right ventricular size and function.   7. Moderate to severe aortic valve stenosis.   8. Thickening and calcification of the anterior and posterior mitral   valve leaflets.   9. Moderate mitral valve regurgitation.  10. Mitral valve mean gradient is 5.0 mmHg consistent with moderate   mitral stenosis.  11. Moderate mitral annular calcification.  12. Mild-moderate tricuspid regurgitation.  13. Estimated pulmonary artery systolic pressure is 55.6 mmHg assuming a   right atrial pressure of 8 mmHg, which is consistent with moderate   pulmonary hypertension.    MD Greer Electronically signed on 2/24/2018 at 4:27:46 PM              *** Final ***        FRANK RITTER   This document has been electronically signed. Feb 24 2018 11:38AM    < end of copied text >

## 2018-02-27 NOTE — PROGRESS NOTE ADULT - ASSESSMENT
89 yr old female with Shortness of breath  concerning for-     CHF/Cardiomyopathy- Valvular heart disease AS, MS and MR  Improved with diuresis. Cardiology discussed with patient and family who would prefer medical mgt and palliation. .   Continue aspirin 81mg qd     DM- Insulin sliding scale     Hypertension -  Lisinopril 10mg po daily     Dementia and delirium - Aricept 10mg po daily . Namenda 5mg po  daily . Celexa 20mg po daily     OAB- oxybutynin      Disposition.  Patient said she would like to go home.  Patient and doctor said not willing to have any invasive cardiac intervention to repair heart valves. Palliative care. PT.  Home tomorrow.

## 2018-02-27 NOTE — PROGRESS NOTE ADULT - ASSESSMENT
Patient is a 89F with HTN, DM, dementia, LBBB p/w acute SOB with CXR consistent with pulmonary edema. BNP elevated, and findings consistent with CHF. In 2008 she had preserved LV function with mild AS- exam today consistent with MR. I suspect CHF exacerbation in setting of valvular heart disease and likely diastolic dysfunction. Pt also being treated for possible pna/sepsis.     I had long meeting with the patients 2 daughters and Patient on 2/26/2018.  Patient had a MISA which showed- Moderate MR, Moderat AS based on Mean gradient is around 24 mm Hg. No pericardial effusion.  Patients baseline - Sedentary life style, confined to Wheel Chair, lives at home with the help of an Aide, who assists patient is getting in and out of Wheel Chair to Bath room and bed. Patient and family were explained about Valvular heart disease, Options of management Invasive versus Conservative medical therapy were discussed with the patient and her 2 daughters. Invasive Strategy included- Cardiac Cath followed by possible TAVR. Procedure risks and benefits were discussed. After careful consideration of these options, Patient and family decided with continuing Conservative Medical Therapy.    Patients family reports that patient is a DNR Code Status.    Discussed with Dr Vimal Matthews.    Since, Patient is doing well on current medical therapy. Patient wants to go home.     PATIENT IS CLEARED TO GO HOME FROM A CARDIOLOGY STANDPOINT    I WILL SIGN OFF

## 2018-02-27 NOTE — PROGRESS NOTE ADULT - SUBJECTIVE AND OBJECTIVE BOX
CC: Cardiomyopathy, valvulopathy - AS, MS, MR.   HPI:  89 yr olf female with known DM, htn, Dementia BIBEMS for SOB given nitrate and CPAP, SOB worsening over last couple days , positive associated with cough, no fever , no phlegm , no chills, no CP, no palpitations , denied edema, denied orthopnea, hasn't seen a cardiologist anita really long time , hasn't been around sick contacts, lives alone at home with aids to help, she has week legs and is essentially wheel chair bound, denied dysuria or skin break or diarrhea (2018 16:40)    REVIEW OF SYSTEMS:    Patient denied fever, chills, abdominal pain, nausea, vomiting, cough, shortness of breath, chest pain or palpitations    Vital Signs Last 24 Hrs  T(C): 36.9 (2018 09:17), Max: 37.3 (2018 15:44)  T(F): 98.4 (2018 09:17), Max: 99.1 (2018 15:44)  HR: 79 (2018 09:17) (57 - 79)  BP: 113/34 (2018 09:17) (113/34 - 138/59)  BP(mean): --  RR: 17 (2018 09:17) (16 - 18)  SpO2: 99% (2018 05:34) (97% - 99%)I&O's Summary    2018 07:  -  2018 07:00  --------------------------------------------------------  IN: 0 mL / OUT: 950 mL / NET: -950 mL    2018 07:  -  2018 12:54  --------------------------------------------------------  IN: 120 mL / OUT: 300 mL / NET: -180 mL      PHYSICAL EXAM:  GENERAL: NAD  HEENT: PERRL, +EOMI, anicteric, no [place]  NECK: Supple, No JVD   CHEST/LUNG: bilateral rales  HEART: S1S2 Normal intensity, no murmurs, gallops or rubs noted  ABDOMEN: Soft, BS Normoactive, NT, ND, no HSM noted  EXTREMITIES: No clubbing, cyanosis, or edema noted.  Limited mobility   SKIN: No rashes or lesions noted  NEURO: A&Ox3, no focal deficits noted, CN II-XII intact  PSYCH: Depressed mood and affect; insight/judgement appropriate  LABS:                        11.2   8.3   )-----------( 271      ( 2018 05:51 )             34.6     02-    138  |  97<L>  |  31.0<H>  ----------------------------<  176<H>  4.3   |  27.0  |  0.53    Ca    8.9      2018 05:51    TPro  6.6  /  Alb  3.5  /  TBili  0.2<L>  /  DBili  0.1  /  AST  26  /  ALT  16  /  AlkPhos  106  -    PT/INR - ( 2018 05:51 )   PT: 12.9 sec;   INR: 1.17 ratio         PTT - ( 2018 05:51 )  PTT:37.4 sec  Urinalysis Basic - ( 2018 00:50 )    Color: Yellow / Appearance: Clear / S.010 / pH: x  Gluc: x / Ketone: Negative  / Bili: Negative / Urobili: Negative mg/dL   Blood: x / Protein: Negative mg/dL / Nitrite: Negative   Leuk Esterase: Trace / RBC: 0-2 /HPF / WBC 3-5   Sq Epi: x / Non Sq Epi: Occasional / Bacteria: Occasional      RADIOLOGY & ADDITIONAL TESTS:    MEDICATIONS:  MEDICATIONS  (STANDING):  aspirin  chewable 81 milliGRAM(s) Oral daily  dextrose 5%. 1000 milliLiter(s) (50 mL/Hr) IV Continuous <Continuous>  dextrose 50% Injectable 12.5 Gram(s) IV Push once  dextrose 50% Injectable 25 Gram(s) IV Push once  dextrose 50% Injectable 25 Gram(s) IV Push once  donepezil 10 milliGRAM(s) Oral at bedtime  enoxaparin Injectable 40 milliGRAM(s) SubCutaneous daily  escitalopram 20 milliGRAM(s) Oral daily  furosemide   Injectable 20 milliGRAM(s) IV Push daily  gemfibrozil 600 milliGRAM(s) Oral two times a day before meals  insulin lispro (HumaLOG) corrective regimen sliding scale   SubCutaneous three times a day before meals  lisinopril 10 milliGRAM(s) Oral daily  memantine 5 milliGRAM(s) Oral daily  oxybutynin 5 milliGRAM(s) Oral three times a day  pantoprazole    Tablet 40 milliGRAM(s) Oral before breakfast  simvastatin 10 milliGRAM(s) Oral at bedtime    MEDICATIONS  (PRN):  dextrose Gel 1 Dose(s) Oral once PRN Blood Glucose LESS THAN 70 milliGRAM(s)/deciliter  glucagon  Injectable 1 milliGRAM(s) IntraMuscular once PRN Glucose LESS THAN 70 milligrams/deciliter

## 2018-02-27 NOTE — PROGRESS NOTE ADULT - PROBLEM SELECTOR PROBLEM 1
Shortness of breath
Acute on chronic diastolic congestive heart failure, NYHA class 3
Aortic stenosis, moderate
Aortic stenosis, moderate

## 2018-02-27 NOTE — PROGRESS NOTE ADULT - PROBLEM SELECTOR PLAN 2
as above
Continue Lasix therapy
Continue Lasix therapy
MISA today to evaluate mitral/aortic valve functional severity

## 2018-02-27 NOTE — PROGRESS NOTE ADULT - PROVIDER SPECIALTY LIST ADULT
CT Surgery
Cardiology
Hospitalist
Palliative Care
Infectious Disease

## 2018-02-27 NOTE — PROGRESS NOTE ADULT - PROBLEM SELECTOR PLAN 1
likely from heart failure; no evidence of PNA. observe off antibiotics
Continue ACE, diuretics  CXR daily
Moderate AS based on Mean gradient.
Moderate AS based on Mean gradient.  Continue current medical management.  Patient to f/u with me in the office in 2 weeks.

## 2018-02-27 NOTE — PROGRESS NOTE ADULT - SUBJECTIVE AND OBJECTIVE BOX
INTERVAL HPI/OVERNIGHT EVENTS: 89yo Female Patient with moderate to severe AS and Dementia was experiencing SOB on exertion   with chest tightening becoming more problematic in past few weeks.  Extremely weak and functional status poor. Cardiology consult appreciated yesterday. It was decided with daughters and patient, that she would not be pursuing  Cardiac Catheterization for possible TAVR.  Risks seem to outweigh benefits at this times. Poor appetite denies constipation; N/V/D CP palpitations or chronic pain     89y old  Female who presents with a chief complaint of difficulty breathing (2018 16:40)    PAST MEDICAL & SURGICAL HISTORY:  Overactive bladder  Dementia  High cholesterol  HTN (hypertension)  DM (diabetes mellitus)    Present Symptoms:     Dyspnea: 0 1- 2 on exertion  OOB/CH at time of my visit  Nausea/Vomiting:  No  Anxiety:  Yes wants to go home  Depression:  No  Fatigue: Yes   Loss of appetite: Yes     Pain: Denies            Character-            Duration-            Effect-            Factors-            Frequency-            Location-            Severity-    Review of Systems: Reviewed                  Unable to obtain due to poor mentation replies no to most questions      MEDICATIONS  (STANDING):  aspirin  chewable 81 milliGRAM(s) Oral daily  dextrose 5%. 1000 milliLiter(s) (50 mL/Hr) IV Continuous <Continuous>  dextrose 50% Injectable 12.5 Gram(s) IV Push once  dextrose 50% Injectable 25 Gram(s) IV Push once  dextrose 50% Injectable 25 Gram(s) IV Push once  donepezil 10 milliGRAM(s) Oral at bedtime  enoxaparin Injectable 40 milliGRAM(s) SubCutaneous daily  escitalopram 20 milliGRAM(s) Oral daily  furosemide   Injectable 20 milliGRAM(s) IV Push daily  gemfibrozil 600 milliGRAM(s) Oral two times a day before meals  insulin lispro (HumaLOG) corrective regimen sliding scale   SubCutaneous three times a day before meals  lisinopril 10 milliGRAM(s) Oral daily  memantine 5 milliGRAM(s) Oral daily  oxybutynin 5 milliGRAM(s) Oral three times a day  pantoprazole    Tablet 40 milliGRAM(s) Oral before breakfast  simvastatin 10 milliGRAM(s) Oral at bedtime    MEDICATIONS  (PRN):  dextrose Gel 1 Dose(s) Oral once PRN Blood Glucose LESS THAN 70 milliGRAM(s)/deciliter  glucagon  Injectable 1 milliGRAM(s) IntraMuscular once PRN Glucose LESS THAN 70 milligrams/deciliter      PHYSICAL EXAM:    Vital Signs Last 24 Hrs  T(C): 36.9 (2018 09:17), Max: 37.3 (2018 15:44)  T(F): 98.4 (2018 09:17), Max: 99.1 (2018 15:44)  HR: 79 (2018 09:17) (57 - 79)  BP: 113/34 (2018 09:17) (113/34 - 138/59)  BP(mean): --  RR: 17 (2018 09:17) (16 - 18)  SpO2: 99% (2018 05:34) (97% - 99%)    General: alert  oriented x 2____     HEENT dry mouth     Lungs: comfortable at rest when OOB/CH wearing O2@ no wheezing    CV: normal      GI: normal                   constipation  last BM:     :   incontinent      MSK:  weakness  edema           bedbound/wheelchair bound    Skin: normal  __  no rash    LABS:                        11.2   8.3   )-----------( 271      ( 2018 05:51 )             34.6     02-26    138  |  97<L>  |  31.0<H>  ----------------------------<  176<H>  4.3   |  27.0  |  0.53    Ca    8.9      2018 05:51    TPro  6.6  /  Alb  3.5  /  TBili  0.2<L>  /  DBili  0.1  /  AST  26  /  ALT  16  /  AlkPhos  106  02-26    PT/INR - ( 2018 05:51 )   PT: 12.9 sec;   INR: 1.17 ratio         PTT - ( 2018 05:51 )  PTT:37.4 sec  Urinalysis Basic - ( 2018 00:50 )    Color: Yellow / Appearance: Clear / S.010 / pH: x  Gluc: x / Ketone: Negative  / Bili: Negative / Urobili: Negative mg/dL   Blood: x / Protein: Negative mg/dL / Nitrite: Negative   Leuk Esterase: Trace / RBC: 0-2 /HPF / WBC 3-5   Sq Epi: x / Non Sq Epi: Occasional / Bacteria: Occasional    I&O's Summary    2018 07:  -  2018 07:00  --------------------------------------------------------  IN: 0 mL / OUT: 950 mL / NET: -950 mL    2018 07:  -  2018 13:47  --------------------------------------------------------  IN: 120 mL / OUT: 300 mL / NET: -180 mL    RADIOLOGY & ADDITIONAL STUDIES:    ADVANCE DIRECTIVES:   DNR YES   Completed on:                     TOVA  YES    Completed on: 2018  Living Will   NO   Completed on:

## 2018-02-28 ENCOUNTER — TRANSCRIPTION ENCOUNTER (OUTPATIENT)
Age: 83
End: 2018-02-28

## 2018-02-28 VITALS
HEART RATE: 62 BPM | DIASTOLIC BLOOD PRESSURE: 60 MMHG | RESPIRATION RATE: 15 BRPM | OXYGEN SATURATION: 98 % | TEMPERATURE: 98 F | SYSTOLIC BLOOD PRESSURE: 118 MMHG

## 2018-02-28 LAB
CULTURE RESULTS: SIGNIFICANT CHANGE UP
GLUCOSE BLDC GLUCOMTR-MCNC: 203 MG/DL — HIGH (ref 70–99)
GLUCOSE BLDC GLUCOMTR-MCNC: 229 MG/DL — HIGH (ref 70–99)
SPECIMEN SOURCE: SIGNIFICANT CHANGE UP

## 2018-02-28 PROCEDURE — 93880 EXTRACRANIAL BILAT STUDY: CPT

## 2018-02-28 PROCEDURE — 86850 RBC ANTIBODY SCREEN: CPT

## 2018-02-28 PROCEDURE — 87486 CHLMYD PNEUM DNA AMP PROBE: CPT

## 2018-02-28 PROCEDURE — 97116 GAIT TRAINING THERAPY: CPT

## 2018-02-28 PROCEDURE — 86900 BLOOD TYPING SEROLOGIC ABO: CPT

## 2018-02-28 PROCEDURE — 85730 THROMBOPLASTIN TIME PARTIAL: CPT

## 2018-02-28 PROCEDURE — 99285 EMERGENCY DEPT VISIT HI MDM: CPT | Mod: 25

## 2018-02-28 PROCEDURE — 93005 ELECTROCARDIOGRAM TRACING: CPT

## 2018-02-28 PROCEDURE — 85027 COMPLETE CBC AUTOMATED: CPT

## 2018-02-28 PROCEDURE — 87086 URINE CULTURE/COLONY COUNT: CPT

## 2018-02-28 PROCEDURE — 87633 RESP VIRUS 12-25 TARGETS: CPT

## 2018-02-28 PROCEDURE — 87798 DETECT AGENT NOS DNA AMP: CPT

## 2018-02-28 PROCEDURE — 94640 AIRWAY INHALATION TREATMENT: CPT

## 2018-02-28 PROCEDURE — 71045 X-RAY EXAM CHEST 1 VIEW: CPT

## 2018-02-28 PROCEDURE — 83605 ASSAY OF LACTIC ACID: CPT

## 2018-02-28 PROCEDURE — 94010 BREATHING CAPACITY TEST: CPT

## 2018-02-28 PROCEDURE — 93325 DOPPLER ECHO COLOR FLOW MAPG: CPT

## 2018-02-28 PROCEDURE — 36415 COLL VENOUS BLD VENIPUNCTURE: CPT

## 2018-02-28 PROCEDURE — 84484 ASSAY OF TROPONIN QUANT: CPT

## 2018-02-28 PROCEDURE — 76882 US LMTD JT/FCL EVL NVASC XTR: CPT

## 2018-02-28 PROCEDURE — 80053 COMPREHEN METABOLIC PANEL: CPT

## 2018-02-28 PROCEDURE — 87641 MR-STAPH DNA AMP PROBE: CPT

## 2018-02-28 PROCEDURE — C8929: CPT

## 2018-02-28 PROCEDURE — 87040 BLOOD CULTURE FOR BACTERIA: CPT

## 2018-02-28 PROCEDURE — 96374 THER/PROPH/DIAG INJ IV PUSH: CPT

## 2018-02-28 PROCEDURE — 96375 TX/PRO/DX INJ NEW DRUG ADDON: CPT

## 2018-02-28 PROCEDURE — 83880 ASSAY OF NATRIURETIC PEPTIDE: CPT

## 2018-02-28 PROCEDURE — 93312 ECHO TRANSESOPHAGEAL: CPT

## 2018-02-28 PROCEDURE — 84134 ASSAY OF PREALBUMIN: CPT

## 2018-02-28 PROCEDURE — 81001 URINALYSIS AUTO W/SCOPE: CPT

## 2018-02-28 PROCEDURE — 87581 M.PNEUMON DNA AMP PROBE: CPT

## 2018-02-28 PROCEDURE — 99239 HOSP IP/OBS DSCHRG MGMT >30: CPT

## 2018-02-28 PROCEDURE — 83036 HEMOGLOBIN GLYCOSYLATED A1C: CPT

## 2018-02-28 PROCEDURE — 93320 DOPPLER ECHO COMPLETE: CPT

## 2018-02-28 PROCEDURE — 85610 PROTHROMBIN TIME: CPT

## 2018-02-28 PROCEDURE — 87640 STAPH A DNA AMP PROBE: CPT

## 2018-02-28 PROCEDURE — 82248 BILIRUBIN DIRECT: CPT

## 2018-02-28 PROCEDURE — 97163 PT EVAL HIGH COMPLEX 45 MIN: CPT

## 2018-02-28 PROCEDURE — 82962 GLUCOSE BLOOD TEST: CPT

## 2018-02-28 PROCEDURE — 86901 BLOOD TYPING SEROLOGIC RH(D): CPT

## 2018-02-28 PROCEDURE — 97530 THERAPEUTIC ACTIVITIES: CPT

## 2018-02-28 RX ORDER — LISINOPRIL 2.5 MG/1
1 TABLET ORAL
Qty: 30 | Refills: 0 | OUTPATIENT
Start: 2018-02-28 | End: 2018-03-29

## 2018-02-28 RX ORDER — CARVEDILOL PHOSPHATE 80 MG/1
1 CAPSULE, EXTENDED RELEASE ORAL
Qty: 60 | Refills: 0 | OUTPATIENT
Start: 2018-02-28 | End: 2018-03-29

## 2018-02-28 RX ORDER — ASPIRIN/CALCIUM CARB/MAGNESIUM 324 MG
1 TABLET ORAL
Qty: 30 | Refills: 0 | OUTPATIENT
Start: 2018-02-28 | End: 2018-03-29

## 2018-02-28 RX ORDER — FUROSEMIDE 40 MG
1 TABLET ORAL
Qty: 30 | Refills: 0 | OUTPATIENT
Start: 2018-02-28 | End: 2018-03-29

## 2018-02-28 RX ORDER — SIMVASTATIN 20 MG/1
1 TABLET, FILM COATED ORAL
Qty: 30 | Refills: 0 | OUTPATIENT
Start: 2018-02-28 | End: 2018-03-29

## 2018-02-28 RX ORDER — FUROSEMIDE 40 MG
20 TABLET ORAL
Qty: 600 | Refills: 0 | OUTPATIENT
Start: 2018-02-28 | End: 2018-03-29

## 2018-02-28 RX ORDER — MEMANTINE HYDROCHLORIDE 10 MG/1
1 TABLET ORAL
Qty: 30 | Refills: 0 | OUTPATIENT
Start: 2018-02-28 | End: 2018-03-29

## 2018-02-28 RX ADMIN — Medication 600 MILLIGRAM(S): at 08:59

## 2018-02-28 RX ADMIN — LISINOPRIL 10 MILLIGRAM(S): 2.5 TABLET ORAL at 06:07

## 2018-02-28 RX ADMIN — Medication 5 MILLIGRAM(S): at 14:45

## 2018-02-28 RX ADMIN — Medication 5 MILLIGRAM(S): at 06:07

## 2018-02-28 RX ADMIN — CARVEDILOL PHOSPHATE 3.12 MILLIGRAM(S): 80 CAPSULE, EXTENDED RELEASE ORAL at 06:07

## 2018-02-28 RX ADMIN — Medication 4: at 08:59

## 2018-02-28 RX ADMIN — Medication 4: at 12:46

## 2018-02-28 RX ADMIN — Medication 81 MILLIGRAM(S): at 12:48

## 2018-02-28 RX ADMIN — Medication 20 MILLIGRAM(S): at 06:07

## 2018-02-28 RX ADMIN — MEMANTINE HYDROCHLORIDE 5 MILLIGRAM(S): 10 TABLET ORAL at 12:45

## 2018-02-28 RX ADMIN — ESCITALOPRAM OXALATE 20 MILLIGRAM(S): 10 TABLET, FILM COATED ORAL at 12:45

## 2018-02-28 RX ADMIN — PANTOPRAZOLE SODIUM 40 MILLIGRAM(S): 20 TABLET, DELAYED RELEASE ORAL at 06:07

## 2018-02-28 NOTE — DISCHARGE NOTE ADULT - SECONDARY DIAGNOSIS.
Congestive heart failure Type 2 diabetes mellitus with diabetic peripheral angiopathy without gangrene, unspecified long term insulin use status Vascular dementia without behavioral disturbance High cholesterol Essential hypertension

## 2018-02-28 NOTE — DISCHARGE NOTE ADULT - HOSPITAL COURSE
89 yr  female with known DM, htn, Dementia BIBEMS for SOB given nitrate and CPAP, SOB worsening over last couple days , positive associated with cough.   (23 Feb 2018 16:40)  Diagnosed Cardiomyopathy from valvular heart disease AS, MR , MS.  Cardiology did a  MISA  showing moderate valvular heart disease.  Patient and family refused further cardiac or valvular invasive intervention measure.  Will continue medical management and palliative care.          Aortic stenosis, moderate.  Plan: Moderate AS based on Mean gradient.  Patient to f/u with  cardiology in  office in 2 weeks.     ·  Problem: Congestive heart failure.  Plan: Continue Lasix therapy.     ·  Problem: Nonsustained ventricular tachycardia. Plan: Asymptomatic nonsustained VT.  Will Start Coreg 3.125 mg BID.    ·  Problem: HTN (hypertension). Plan: Continue lisinopril and lasix.

## 2018-02-28 NOTE — DISCHARGE NOTE ADULT - PATIENT PORTAL LINK FT
You can access the [facility] Portal, offered by [facility], by registering with the following website: [email or web address]

## 2018-02-28 NOTE — DISCHARGE NOTE ADULT - MEDICATION SUMMARY - MEDICATIONS TO TAKE
I will START or STAY ON the medications listed below when I get home from the hospital:    aspirin 81 mg oral tablet, chewable  -- 1 tab(s) by mouth once a day  -- Indication: For Aortic stenosis, moderate    quinapril 10 mg oral tablet  -- 1 tab(s) by mouth 2 times a day  -- Indication: For HTN (hypertension)    Lexapro 20 mg oral tablet  -- 1 tab(s) by mouth once a day  -- Indication: For Dementia    Lantus  --  subcutaneous   -- Indication: For DM (diabetes mellitus)    gemfibrozil 600 mg oral tablet  -- 1 tab(s) by mouth 2 times a day  -- Indication: For Aortic valve stenosis, etiology of cardiac valve disease unspecified    carvedilol 3.125 mg oral tablet  -- 1 tab(s) by mouth every 12 hours  -- Indication: For HTN (hypertension)    donepezil 10 mg oral tablet  -- 1 tab(s) by mouth once a day (at bedtime)  -- Indication: For Dementia    memantine 5 mg oral tablet  -- 1 tab(s) by mouth once a day   -- Indication: For Dementia    omeprazole  --  by mouth   -- Indication: For GI protection     oxybutynin 5 mg oral tablet  -- 1 tab(s) by mouth 3 times a day  -- Indication: For overactive bladder I will START or STAY ON the medications listed below when I get home from the hospital:    aspirin 81 mg oral tablet, chewable  -- 1 tab(s) by mouth once a day  -- Indication: For Aortic stenosis, moderate    lisinopril 10 mg oral tablet  -- 1 tab(s) by mouth once a day  -- Indication: For HTN (hypertension)    Lexapro 20 mg oral tablet  -- 1 tab(s) by mouth once a day  -- Indication: For Dementia    Lantus  --  subcutaneous   -- Indication: For DM (diabetes mellitus)    simvastatin 10 mg oral tablet  -- 1 tab(s) by mouth once a day   -- Indication: For Hld    gemfibrozil 600 mg oral tablet  -- 1 tab(s) by mouth 2 times a day  -- Indication: For Aortic valve stenosis, etiology of cardiac valve disease unspecified    simvastatin 10 mg oral tablet  -- 1 tab(s) by mouth once a day (at bedtime)  -- Indication: For Hld    carvedilol 3.125 mg oral tablet  -- 1 tab(s) by mouth every 12 hours  -- Indication: For HTN (hypertension)    donepezil 10 mg oral tablet  -- 1 tab(s) by mouth once a day (at bedtime)  -- Indication: For Dementia    furosemide 20 mg oral tablet  -- 20 milligram(s) by mouth once a day   -- Indication: For Chf    memantine 5 mg oral tablet  -- 1 tab(s) by mouth once a day   -- Indication: For Dementia    omeprazole  --  by mouth   -- Indication: For GI protection     oxybutynin 5 mg oral tablet  -- 1 tab(s) by mouth 3 times a day  -- Indication: For overactive bladder

## 2018-02-28 NOTE — DISCHARGE NOTE ADULT - CARE PROVIDER_API CALL
PMD,   PMD  Phone: (   )    -  Fax: (   )    -    Pipo Fierro), Cardiology; Cardiovascular Disease; Internal Medicine  [address] [place] [address]  Phone: [phone]  Fax: [phone]

## 2018-02-28 NOTE — DISCHARGE NOTE ADULT - MEDICATION SUMMARY - MEDICATIONS TO STOP TAKING
I will STOP taking the medications listed below when I get home from the hospital:    amLODIPine 5 mg oral tablet  -- 1 tab(s) by mouth 2 times a day    sulfamethoxazole  --

## 2018-02-28 NOTE — DISCHARGE NOTE ADULT - PLAN OF CARE
ADLs Follow up with pmd in 5-10 days   Ambulate with assistance and a walker  Heart healthy diet Follow up with cardiology in 2-4 weeks.

## 2018-02-28 NOTE — DISCHARGE NOTE ADULT - CARE PLAN
Principal Discharge DX:	Acute on chronic diastolic congestive heart failure  Goal:	ADLs  Assessment and plan of treatment:	Follow up with pmd in 5-10 days   Ambulate with assistance and a walker  Heart healthy diet  Secondary Diagnosis:	Congestive heart failure  Assessment and plan of treatment:	Follow up with cardiology in 2-4 weeks.  Secondary Diagnosis:	Type 2 diabetes mellitus with diabetic peripheral angiopathy without gangrene, unspecified long term insulin use status  Secondary Diagnosis:	Vascular dementia without behavioral disturbance  Secondary Diagnosis:	High cholesterol  Secondary Diagnosis:	Essential hypertension

## 2018-11-27 ENCOUNTER — EMERGENCY (EMERGENCY)
Facility: HOSPITAL | Age: 83
LOS: 1 days | Discharge: HOME | End: 2018-11-27
Attending: EMERGENCY MEDICINE
Payer: MEDICARE

## 2018-11-27 VITALS
HEART RATE: 78 BPM | OXYGEN SATURATION: 99 % | RESPIRATION RATE: 18 BRPM | TEMPERATURE: 98 F | SYSTOLIC BLOOD PRESSURE: 144 MMHG | DIASTOLIC BLOOD PRESSURE: 81 MMHG

## 2018-11-27 VITALS
RESPIRATION RATE: 18 BRPM | SYSTOLIC BLOOD PRESSURE: 152 MMHG | DIASTOLIC BLOOD PRESSURE: 64 MMHG | TEMPERATURE: 98 F | HEART RATE: 68 BPM | OXYGEN SATURATION: 100 %

## 2018-11-27 LAB
ALBUMIN SERPL ELPH-MCNC: 3.8 G/DL — SIGNIFICANT CHANGE UP (ref 3.3–5.2)
ALP SERPL-CCNC: 150 U/L — HIGH (ref 40–120)
ALT FLD-CCNC: 11 U/L — SIGNIFICANT CHANGE UP
ANION GAP SERPL CALC-SCNC: 14 MMOL/L — SIGNIFICANT CHANGE UP (ref 5–17)
APTT BLD: 35.1 SEC — SIGNIFICANT CHANGE UP (ref 27.5–36.3)
AST SERPL-CCNC: 20 U/L — SIGNIFICANT CHANGE UP
BILIRUB SERPL-MCNC: 0.4 MG/DL — SIGNIFICANT CHANGE UP (ref 0.4–2)
BUN SERPL-MCNC: 16 MG/DL — SIGNIFICANT CHANGE UP (ref 8–20)
CALCIUM SERPL-MCNC: 9.1 MG/DL — SIGNIFICANT CHANGE UP (ref 8.6–10.2)
CHLORIDE SERPL-SCNC: 97 MMOL/L — LOW (ref 98–107)
CO2 SERPL-SCNC: 25 MMOL/L — SIGNIFICANT CHANGE UP (ref 22–29)
CREAT SERPL-MCNC: 0.43 MG/DL — LOW (ref 0.5–1.3)
GLUCOSE SERPL-MCNC: 258 MG/DL — HIGH (ref 70–115)
HCT VFR BLD CALC: 28.8 % — LOW (ref 37–47)
HGB BLD-MCNC: 9.1 G/DL — LOW (ref 12–16)
INR BLD: 1.29 RATIO — HIGH (ref 0.88–1.16)
MCHC RBC-ENTMCNC: 24.1 PG — LOW (ref 27–31)
MCHC RBC-ENTMCNC: 31.6 G/DL — LOW (ref 32–36)
MCV RBC AUTO: 76.4 FL — LOW (ref 81–99)
PLATELET # BLD AUTO: 246 K/UL — SIGNIFICANT CHANGE UP (ref 150–400)
POTASSIUM SERPL-MCNC: 4 MMOL/L — SIGNIFICANT CHANGE UP (ref 3.5–5.3)
POTASSIUM SERPL-SCNC: 4 MMOL/L — SIGNIFICANT CHANGE UP (ref 3.5–5.3)
PROT SERPL-MCNC: 7.5 G/DL — SIGNIFICANT CHANGE UP (ref 6.6–8.7)
PROTHROM AB SERPL-ACNC: 14.9 SEC — HIGH (ref 10–12.9)
RBC # BLD: 3.77 M/UL — LOW (ref 4.4–5.2)
RBC # FLD: 15.7 % — HIGH (ref 11–15.6)
SODIUM SERPL-SCNC: 136 MMOL/L — SIGNIFICANT CHANGE UP (ref 135–145)
WBC # BLD: 12.4 K/UL — HIGH (ref 4.8–10.8)
WBC # FLD AUTO: 12.4 K/UL — HIGH (ref 4.8–10.8)

## 2018-11-27 PROCEDURE — 85730 THROMBOPLASTIN TIME PARTIAL: CPT

## 2018-11-27 PROCEDURE — 70450 CT HEAD/BRAIN W/O DYE: CPT

## 2018-11-27 PROCEDURE — 36415 COLL VENOUS BLD VENIPUNCTURE: CPT

## 2018-11-27 PROCEDURE — 76377 3D RENDER W/INTRP POSTPROCES: CPT

## 2018-11-27 PROCEDURE — 97167 OT EVAL HIGH COMPLEX 60 MIN: CPT

## 2018-11-27 PROCEDURE — 76377 3D RENDER W/INTRP POSTPROCES: CPT | Mod: 26

## 2018-11-27 PROCEDURE — 99284 EMERGENCY DEPT VISIT MOD MDM: CPT

## 2018-11-27 PROCEDURE — 72192 CT PELVIS W/O DYE: CPT | Mod: 26

## 2018-11-27 PROCEDURE — 72125 CT NECK SPINE W/O DYE: CPT

## 2018-11-27 PROCEDURE — 70450 CT HEAD/BRAIN W/O DYE: CPT | Mod: 26

## 2018-11-27 PROCEDURE — 80053 COMPREHEN METABOLIC PANEL: CPT

## 2018-11-27 PROCEDURE — 85610 PROTHROMBIN TIME: CPT

## 2018-11-27 PROCEDURE — 85027 COMPLETE CBC AUTOMATED: CPT

## 2018-11-27 PROCEDURE — 72125 CT NECK SPINE W/O DYE: CPT | Mod: 26

## 2018-11-27 PROCEDURE — 72192 CT PELVIS W/O DYE: CPT

## 2018-11-27 PROCEDURE — 99284 EMERGENCY DEPT VISIT MOD MDM: CPT | Mod: 25

## 2018-11-27 RX ORDER — ACETAMINOPHEN 500 MG
650 TABLET ORAL ONCE
Qty: 0 | Refills: 0 | Status: COMPLETED | OUTPATIENT
Start: 2018-11-27 | End: 2018-11-27

## 2018-11-27 RX ADMIN — Medication 650 MILLIGRAM(S): at 11:00

## 2018-11-27 RX ADMIN — Medication 650 MILLIGRAM(S): at 12:33

## 2018-11-27 NOTE — OCCUPATIONAL THERAPY INITIAL EVALUATION ADULT - GENERAL OBSERVATIONS, REHAB EVAL
Received pt in semi-mcdonald position on stretcher, +IV lock, +1.5L NCO2, +telemetry, two daughters and private aide present; pt agrees to OT.

## 2018-11-27 NOTE — PROVIDER CONTACT NOTE (OTHER) - NAME OF MD/NP/PA/DO NOTIFIED:
dr Pinedo
Discussed evaluation, status, training and need for homecare OT/PT with Dr. Pinedo.
Dr. Malinda Pinedo

## 2018-11-27 NOTE — PHYSICAL THERAPY INITIAL EVALUATION ADULT - ADDITIONAL COMMENTS
Pt lives with 24/7 HHA in a 1 story house with 3 steps to enter.  Pt is primarily w/c bound and requires assist with all amb, transfers, ADLs and self care.  Has shower chair and commode.

## 2018-11-27 NOTE — OCCUPATIONAL THERAPY INITIAL EVALUATION ADULT - PERTINENT HX OF CURRENT PROBLEM, REHAB EVAL
Pt presents to ED s/p unwitnessed fall from wheelchair. Pelvic CT reveals acute mildly displaced fractures of the mid portions of the right superior and inferior pubic rami; status post bilateral hip arthroplasty; findings suggestive of liner wear involving the left hip prosthesis; no evidence of osteolysis or loosening.

## 2018-11-27 NOTE — PHYSICAL THERAPY INITIAL EVALUATION ADULT - WORK/LEISURE ACTIVITY, REHAB EVAL
HIPAa verified. Requested lab orders faxed to [facility] @ [phone]. Faxed complete per Kae/PSR. needs device and assist

## 2018-11-27 NOTE — ED ADULT TRIAGE NOTE - CHIEF COMPLAINT QUOTE
Pt presents to ED from home for eval of s/p fall from wheelchair yesterday. Pt states that she fell backwards out of chair striking her head and body. Pt and aide denies LOC. C/o right hip pain and back pain. Eval by MD at [facility], called priority CT.

## 2018-11-27 NOTE — PROVIDER CONTACT NOTE (OTHER) - SITUATION
CM informed that pt will require HA for dc.
Chart reviewed, contents noted. Spoke to RN (Sveta) to confirm pt clear for OT. Received pt in semi-mcdonald position on stretcher, +IV lock, +1.5LNCO2, +telemetry, 2 daughters and aide (Nadia) present.
PT order received, chart reviewed and contents noted.  PT eval completed and documented.

## 2018-11-27 NOTE — PHYSICAL THERAPY INITIAL EVALUATION ADULT - RANGE OF MOTION EXAMINATION, REHAB EVAL
bilateral upper extremity ROM was WFL (within functional limits)/bilateral lower extremity ROM was WFL (within functional limits)/WFLs t/o except right hip limited due to pain

## 2018-11-27 NOTE — H&P ADULT - ATTENDING COMMENTS
Patient has been seen and examined.  Images reviewed.  24 hrs ago she fell and has a mildly displaced pubic rami fracture.  she is HD normal  No further workup required from the trauma stand point.  She has been evaluated by PT and  have deem her safe discharge.

## 2018-11-27 NOTE — ED PROVIDER NOTE - OBJECTIVE STATEMENT
89 yo female with a pmh of dementia, HTN, HLD, and DM presents after a fall from her wheelchair. Fall was unwitnessed. Unsure if pt hit head, no LOC, no n/v. pt states to have pain to her  right hip and denies any disorientation. pt is on no blood thinner. take daily baby aspirin. denies any other symptoms including n/v, chest pain, or SOB. pt does not ambulate a baseline.

## 2018-11-27 NOTE — DISCHARGE NOTE ADULT - PATIENT PORTAL LINK FT
You can access the [email or web address], offered by [facility], by registering with the following website: [email or web address]

## 2018-11-27 NOTE — OCCUPATIONAL THERAPY INITIAL EVALUATION ADULT - ADDITIONAL COMMENTS
Pt lives in house with 3 TOVA and no steps inside; bedroom and bathroom are on main level. Bathroom has bathtub with doors. Pt owns shower chair, commode, transport wheelchair. Pt is right handed. Pt has 24/7 live in private aide to assist with all ADLs/transfers. Pt was wheelchair level prior to admission for all ADLs and transfers; pt would use LEs in wheelchair to "scoot" self around rooms as per family.

## 2018-11-27 NOTE — CONSULT NOTE ADULT - SUBJECTIVE AND OBJECTIVE BOX
Pt Name: SHARON HARDING    MRN: [id number]      Patient is a 89y Female presenting to the emergency department with a chief complaint of fall out of wheelchair yesterday.    Patient is a 89y old  Female who presents with a chief complaint of right hip pain s/p fall out of wheelchair while transferring to day chair. Patient is not ambulator at this time. She requires assistance of an aide to transfer between chairs. She is s/p bilateral total hip arthroplasties in the past. She did have a right distal femur fracture requiring open reduction internal fixation after that hip replacement. She denies of other sites of pain.  Daughters present at time of exam.      REVIEW OF SYSTEMS    General: Alert, responsive, in NAD    Skin/Breast: No rashes, no pruritis   	  Ophthalmologic: No visual changes. No redness.   	  ENMT:	Decreased hearing. No eye discharge. No swelling.    Respiratory and Thorax: No difficulty breathing. No cough.  	   Cardiovascular:	No chest pain. No palpitations.    Gastrointestinal:	 No abdominal pain. No diarrhea.     Genitourinary: No dysuria. No bleeding.    Musculoskeletal: SEE HPI.    Neurological: + peripheral neuropathy.    Psychiatric: No anxiety or depression.    Hematology/Lymphatics: No swelling.    Endocrine: Hx of diabetes.    ROS is otherwise negative.    PAST MEDICAL & SURGICAL HISTORY:  PAST MEDICAL & SURGICAL HISTORY:  Overactive bladder  Dementia  High cholesterol  HTN (hypertension)  DM (diabetes mellitus)      Allergies: No Known Allergies      Medications:     FAMILY HISTORY: no pertinent family history in first degree relatives : non-contributory    Social History: non-smoker        Vital Signs Last 24 Hrs  T(C): 36.9 (27 Nov 2018 10:26), Max: 36.9 (27 Nov 2018 10:26)  T(F): 98.4 (27 Nov 2018 10:26), Max: 98.4 (27 Nov 2018 10:26)  HR: 78 (27 Nov 2018 10:26) (78 - 78)  BP: 144/81 (27 Nov 2018 10:26) (144/81 - 144/81)  BP(mean): --  RR: 18 (27 Nov 2018 10:26) (18 - 18)  SpO2: 99% (27 Nov 2018 10:26) (99% - 99%)          PHYSICAL EXAM:      Appearance: Alert, responsive, in no acute distress.    Neurological: Sensation is grossly intact to light touch. 5/5 motor function of all extremities. No focal deficits or weaknesses found.    Skin: no rash on visible skin. Skin is clean, dry and intact. No bleeding. No abrasions. No ulcerations.    Vascular: 2+ distal pulses. Cap refill < 2 sec. No signs of venous insufficiency or stasis. No extremity ulcerations. No cyanosis.    Musculoskeletal:         Left Upper Extremity:  + NROM. Non-tender. No signs of trauma.        Right Upper Extremity:  + NROM. Non-tender. No signs of trauma.        Left Lower Extremity:  + NROM. Non-tender. No signs of trauma.        Right Lower Extremity: + right lateral hip tenderness. + reproducible pain with PROM of the right hip. No knee or ankle tenderness. No calf tenderness.     Imaging Studies:    < from: CT Pelvis Bony Only No Cont (11.27.18 @ 10:53) >   EXAM:  CT PELVIS BONY ONLY                         EXAM:  CT 3D RECONSTRUCT W ALEJANDRO                          PROCEDURE DATE:  11/27/2018          INTERPRETATION:  HISTORY: Status post fall with pelvic pain.    Helical CT imaging of the bony pelvis was performed without intravenous   contrast. Sagittal and coronal reformats were provided. 3-D reformats   were performed on a separate workstation.    Correlation is made with prior CT of the abdomen pelvis from January 28, 2016.    Findings:    Studies limited due to marked osseous demineralization. There are acute   mildly displaced fractures through the mid portions of both the right   superior and inferior pubic rami with minimal displacement.    Patient is status post right total hiparthroplasty with acetabular   augmentation screws and a noncemented femoral component. There is   unchanged, posttraumatic deformity of the right acetabulum. A right   femoral fixation plate is partially imaged extending from the proximal   femur through the imaged portion of the remaining right femur. Patient is   also status post left total hip arthroplasty with a cemented femoral   component. The left femoral component is situated asymmetrically along   the superolateral aspect of the left acetabular component. Findings are   suggestive of liner wear. There is no evidence of osteolysis or loosening   about either hip prosthesis. There is no evidence of periprosthetic   fracture.    There is moderate pubic symphysis arthrosis with chondrocalcinosis. There   is mild bilateral sacroiliac joint arthrosis. There is lower lumbar   spondylosis with disc space narrowing and vacuum phenomenon at L3-L4 and   L4-L5. Lower lumbar facet arthrosis is noted.    There is mild soft tissue edema about the right pubic rami fractures.   There is no well-defined intramuscular or subcutaneous hematoma. There is   confluent subcutaneous edema within the posterior lateral subcutaneous   fat bilaterally, greater on the right related to soft tissue contusion.    Atherosclerotic disease is noted. There is a cyst along the inferior pole   of the left kidney. There is a small fat-containing umbilicus hernia.    Impression:    Acute mildly displaced fractures of the mid portions of the right   superior andinferior pubic rami.    Status post bilateral hip arthroplasty. Findings suggestive of liner wear   involving the left hip prosthesis. No evidence of osteolysis or loosening.      JUAN LUIS CHAUDHARY M.D., ATTENDING RADIOLOGIST  This document hasbeen electronically signed. Nov 27 2018 11:12AM          A/P:  Pt is a  89y Female with right hip pain found to have superior and inferior rami fx's s/p bilateral total hip replacements    PLAN:   * WBAT with assistance of the right LE  * Office follow-up in 1-2 weeks  * Pain control  * Recommend OT/PT eval's to decide placement (home vs [facility])

## 2018-11-27 NOTE — ED ADULT NURSE NOTE - NS ED NURSE LEVEL OF CONSCIOUSNESS ORIENTATION
Oriented - self; Oriented - place; Oriented - time
no abrasions, no jaundice, no lesions, no pruritis, and no rashes.

## 2018-11-27 NOTE — ED PROVIDER NOTE - MEDICAL DECISION MAKING DETAILS
89 yo female presents after a fall. will order pain medication and ct to eval for fracture. reevaluate.

## 2018-11-27 NOTE — ED ADULT NURSE NOTE - CHIEF COMPLAINT QUOTE
Pt presents to ED from home for eval of s/p fall from wheelchair yesterday. Pt states that she fell backwards out of chair striking her head and body. Pt and aide denies LOC. C/o right hip pain and back pain. Eval by MD at [facility], called priority CT.

## 2018-11-27 NOTE — PROVIDER CONTACT NOTE (OTHER) - BACKGROUND
Pt agrees to OT, tolerated well. OT eval completed; see documents for details. Pt left in semi-mcdonald position on stretcher, +IV lock, +1.5LNCO2, +telemetry, 2 daughters and aide (Nadia) present, NAD.

## 2018-11-27 NOTE — ED PROVIDER NOTE - PROGRESS NOTE DETAILS
ct results a right superior and inferior pubic rami fracture. ortho PA consulted on pt. discussed results and plan with o pt, daughter, and aide. pt has 24/hr home care. joliether doesn't want to wait for repeat h/h/ wants to go understands risk of bleeding will dc and arrange ambulance as she requested

## 2018-11-27 NOTE — ED PROVIDER NOTE - ATTENDING CONTRIBUTION TO CARE
I personally saw the patient with the PA, and completed the key components of the history and physical exam. I then discussed the management plan with the PA.  s/p fall yesterday presents with right groin pain no asa normally only in WC and transfers denies abd pain no LOC no neck or back pain   lungs cta hrrr abd soft ttp right groin   n/v intact cn2-12intact   a/p trauma.ortho consult re-eval pt/ot consults done ok for d/c and family and pt prefer

## 2018-11-27 NOTE — PROVIDER CONTACT NOTE (OTHER) - RECOMMENDATIONS
Home with 24/7 assist and Home PT for transfer training and home assessment
Reinforced safety, energy conservation, proper breathing, footwear, call bell use for assist with needs. Emphasized safe transfer techniques and proper body mechanics with aide/family.

## 2018-11-27 NOTE — ED ADULT NURSE NOTE - OBJECTIVE STATEMENT
pt came in s/p fall from chair to floor, pt stated she has 8/10 pain in her lower back.  no deformities noted she denied chest pain breaths are even and unlabored

## 2018-11-27 NOTE — PROVIDER CONTACT NOTE (OTHER) - ASSESSMENT
CM met w/ pt and multiple family members at bs.  Pt has 24/7 live in [facility].  Family requesitng info regarding different house calls programs and [facility]. Info provided and explained.  Choicel ist  given for [facility], family choosing [facility].  Referral made via ACM, soc for tomorrow.
Pt with no c/o pain before, during or after RX.  Pt will benefit from PT to maximize functional independence.  Will continue to follow.  Pt left supine in bed in no apparent distress and call bell within reach. Nurse aware.
Family training completed with aide (Nadia) providing hands-on assistance and family observing. Aide demonstrates ability to provide assistance and aide/family verbalize understanding on need for assistance for safety.

## 2018-11-27 NOTE — OCCUPATIONAL THERAPY INITIAL EVALUATION ADULT - SENSORY TESTS
pt/family reports pt with bilateral LE neuropathy; pt with +bilateral pedal pulses; pt with +capillary refill in bilateral toes

## 2018-11-27 NOTE — OCCUPATIONAL THERAPY INITIAL EVALUATION ADULT - ASSISTIVE DEVICE FOR TOILET TRANSFER, REHAB EVAL
SPT; reinforced use of bedside commode only for safe bathroom transfers as family reports unable to get wheelchair into bathroom

## 2018-11-27 NOTE — PHYSICAL THERAPY INITIAL EVALUATION ADULT - LEVEL OF INDEPENDENCE: SIT/STAND, REHAB EVAL
Met with pt and spouse at bedside. Pt anticipates need for rehab at discharge, PT recs noted. FOC offered and pt chose [facility]; referral placed with CMS. Pt and spouse aware that medical transportation available, pt could incur cost for transport. Plan: Pending insurance approval: [facility] [address] [facility] for tomorrow 8/10 admission if pt medically stable for discharge. RN please call report to [phone] and Please include all hard scripts for controlled substances, med rec and dc summary in packet. Please medicate for pain prior to dc if possible and needed to help offset delay when patient first arrives to facility. Care Management Interventions  PCP Verified by CM:  Yes  Transition of Care Consult (CM Consult): SNF  Partner SNF: Yes  Discharge Durable Medical Equipment: No  Physical Therapy Consult: Yes  Occupational Therapy Consult: Yes  Current Support Network: Lives with Spouse, Own Home  Confirm Follow Up Transport: Family  Plan discussed with Pt/Family/Caregiver: Yes  Freedom of Choice Offered: Yes  Discharge Location  Discharge Placement: Skilled nursing facility minimum assist (75% patients effort)/moderate assist (50% patients effort)

## 2018-11-29 ENCOUNTER — EMERGENCY (EMERGENCY)
Facility: HOSPITAL | Age: 83
LOS: 1 days | Discharge: HOME | End: 2018-11-29
Attending: EMERGENCY MEDICINE
Payer: MEDICARE

## 2018-11-29 VITALS
RESPIRATION RATE: 18 BRPM | DIASTOLIC BLOOD PRESSURE: 65 MMHG | HEART RATE: 63 BPM | TEMPERATURE: 98 F | OXYGEN SATURATION: 95 % | SYSTOLIC BLOOD PRESSURE: 174 MMHG

## 2018-11-29 LAB
ACETONE SERPL-MCNC: NEGATIVE — SIGNIFICANT CHANGE UP
ALBUMIN SERPL ELPH-MCNC: 3.4 G/DL — SIGNIFICANT CHANGE UP (ref 3.3–5.2)
ALP SERPL-CCNC: 133 U/L — HIGH (ref 40–120)
ALT FLD-CCNC: 11 U/L — SIGNIFICANT CHANGE UP
ANION GAP SERPL CALC-SCNC: 15 MMOL/L — SIGNIFICANT CHANGE UP (ref 5–17)
APPEARANCE UR: CLEAR — SIGNIFICANT CHANGE UP
APTT BLD: 30.4 SEC — SIGNIFICANT CHANGE UP (ref 27.5–36.3)
AST SERPL-CCNC: 18 U/L — SIGNIFICANT CHANGE UP
BACTERIA # UR AUTO: ABNORMAL
BASOPHILS # BLD AUTO: 0 K/UL — SIGNIFICANT CHANGE UP (ref 0–0.2)
BASOPHILS NFR BLD AUTO: 0.2 % — SIGNIFICANT CHANGE UP (ref 0–2)
BILIRUB SERPL-MCNC: 0.3 MG/DL — LOW (ref 0.4–2)
BILIRUB UR-MCNC: NEGATIVE — SIGNIFICANT CHANGE UP
BUN SERPL-MCNC: 20 MG/DL — SIGNIFICANT CHANGE UP (ref 8–20)
CALCIUM SERPL-MCNC: 9 MG/DL — SIGNIFICANT CHANGE UP (ref 8.6–10.2)
CHLORIDE SERPL-SCNC: 92 MMOL/L — LOW (ref 98–107)
CO2 SERPL-SCNC: 24 MMOL/L — SIGNIFICANT CHANGE UP (ref 22–29)
COLOR SPEC: YELLOW — SIGNIFICANT CHANGE UP
CREAT SERPL-MCNC: 0.47 MG/DL — LOW (ref 0.5–1.3)
DIFF PNL FLD: ABNORMAL
EOSINOPHIL # BLD AUTO: 0.1 K/UL — SIGNIFICANT CHANGE UP (ref 0–0.5)
EOSINOPHIL NFR BLD AUTO: 1.3 % — SIGNIFICANT CHANGE UP (ref 0–6)
EPI CELLS # UR: SIGNIFICANT CHANGE UP
GLUCOSE SERPL-MCNC: 332 MG/DL — HIGH (ref 70–115)
GLUCOSE UR QL: 50 MG/DL
HCT VFR BLD CALC: 27.5 % — LOW (ref 37–47)
HGB BLD-MCNC: 8.4 G/DL — LOW (ref 12–16)
INR BLD: 1.34 RATIO — HIGH (ref 0.88–1.16)
KETONES UR-MCNC: NEGATIVE — SIGNIFICANT CHANGE UP
LEUKOCYTE ESTERASE UR-ACNC: ABNORMAL
LYMPHOCYTES # BLD AUTO: 1.2 K/UL — SIGNIFICANT CHANGE UP (ref 1–4.8)
LYMPHOCYTES # BLD AUTO: 10.6 % — LOW (ref 20–55)
MCHC RBC-ENTMCNC: 23.1 PG — LOW (ref 27–31)
MCHC RBC-ENTMCNC: 30.5 G/DL — LOW (ref 32–36)
MCV RBC AUTO: 75.8 FL — LOW (ref 81–99)
MONOCYTES # BLD AUTO: 0.7 K/UL — SIGNIFICANT CHANGE UP (ref 0–0.8)
MONOCYTES NFR BLD AUTO: 6.3 % — SIGNIFICANT CHANGE UP (ref 3–10)
NEUTROPHILS # BLD AUTO: 8.8 K/UL — HIGH (ref 1.8–8)
NEUTROPHILS NFR BLD AUTO: 80.4 % — HIGH (ref 37–73)
NITRITE UR-MCNC: NEGATIVE — SIGNIFICANT CHANGE UP
PH UR: 6 — SIGNIFICANT CHANGE UP (ref 5–8)
PLATELET # BLD AUTO: 252 K/UL — SIGNIFICANT CHANGE UP (ref 150–400)
POTASSIUM SERPL-MCNC: 3.5 MMOL/L — SIGNIFICANT CHANGE UP (ref 3.5–5.3)
POTASSIUM SERPL-SCNC: 3.5 MMOL/L — SIGNIFICANT CHANGE UP (ref 3.5–5.3)
PROT SERPL-MCNC: 7 G/DL — SIGNIFICANT CHANGE UP (ref 6.6–8.7)
PROT UR-MCNC: 15 MG/DL
PROTHROM AB SERPL-ACNC: 15.5 SEC — HIGH (ref 10–12.9)
RBC # BLD: 3.63 M/UL — LOW (ref 4.4–5.2)
RBC # FLD: 15.7 % — HIGH (ref 11–15.6)
RBC CASTS # UR COMP ASSIST: ABNORMAL /HPF (ref 0–4)
SODIUM SERPL-SCNC: 131 MMOL/L — LOW (ref 135–145)
SP GR SPEC: 1.01 — SIGNIFICANT CHANGE UP (ref 1.01–1.02)
UROBILINOGEN FLD QL: NEGATIVE MG/DL — SIGNIFICANT CHANGE UP
WBC # BLD: 10.9 K/UL — HIGH (ref 4.8–10.8)
WBC # FLD AUTO: 10.9 K/UL — HIGH (ref 4.8–10.8)
WBC UR QL: SIGNIFICANT CHANGE UP

## 2018-11-29 PROCEDURE — 99284 EMERGENCY DEPT VISIT MOD MDM: CPT | Mod: 25

## 2018-11-29 PROCEDURE — 96372 THER/PROPH/DIAG INJ SC/IM: CPT | Mod: XU

## 2018-11-29 PROCEDURE — 85610 PROTHROMBIN TIME: CPT

## 2018-11-29 PROCEDURE — 85027 COMPLETE CBC AUTOMATED: CPT

## 2018-11-29 PROCEDURE — 99284 EMERGENCY DEPT VISIT MOD MDM: CPT

## 2018-11-29 PROCEDURE — 82009 KETONE BODYS QUAL: CPT

## 2018-11-29 PROCEDURE — 81001 URINALYSIS AUTO W/SCOPE: CPT

## 2018-11-29 PROCEDURE — 96374 THER/PROPH/DIAG INJ IV PUSH: CPT

## 2018-11-29 PROCEDURE — 82962 GLUCOSE BLOOD TEST: CPT

## 2018-11-29 PROCEDURE — 96361 HYDRATE IV INFUSION ADD-ON: CPT

## 2018-11-29 PROCEDURE — 87086 URINE CULTURE/COLONY COUNT: CPT

## 2018-11-29 PROCEDURE — 80053 COMPREHEN METABOLIC PANEL: CPT

## 2018-11-29 PROCEDURE — 85730 THROMBOPLASTIN TIME PARTIAL: CPT

## 2018-11-29 PROCEDURE — 36415 COLL VENOUS BLD VENIPUNCTURE: CPT

## 2018-11-29 RX ORDER — NITROFURANTOIN MACROCRYSTAL 50 MG
1 CAPSULE ORAL
Qty: 14 | Refills: 0 | OUTPATIENT
Start: 2018-11-29 | End: 2018-12-05

## 2018-11-29 RX ORDER — ACETAMINOPHEN 500 MG
650 TABLET ORAL ONCE
Qty: 0 | Refills: 0 | Status: COMPLETED | OUTPATIENT
Start: 2018-11-29 | End: 2018-11-29

## 2018-11-29 RX ORDER — KETOROLAC TROMETHAMINE 30 MG/ML
15 SYRINGE (ML) INJECTION ONCE
Qty: 0 | Refills: 0 | Status: DISCONTINUED | OUTPATIENT
Start: 2018-11-29 | End: 2018-11-29

## 2018-11-29 RX ORDER — INSULIN GLARGINE 100 [IU]/ML
30 INJECTION, SOLUTION SUBCUTANEOUS ONCE
Qty: 0 | Refills: 0 | Status: COMPLETED | OUTPATIENT
Start: 2018-11-29 | End: 2018-11-29

## 2018-11-29 RX ORDER — SODIUM CHLORIDE 9 MG/ML
1000 INJECTION INTRAMUSCULAR; INTRAVENOUS; SUBCUTANEOUS ONCE
Qty: 0 | Refills: 0 | Status: COMPLETED | OUTPATIENT
Start: 2018-11-29 | End: 2018-11-29

## 2018-11-29 RX ORDER — NITROFURANTOIN MACROCRYSTAL 50 MG
100 CAPSULE ORAL ONCE
Qty: 0 | Refills: 0 | Status: COMPLETED | OUTPATIENT
Start: 2018-11-29 | End: 2018-11-29

## 2018-11-29 RX ADMIN — Medication 650 MILLIGRAM(S): at 18:57

## 2018-11-29 RX ADMIN — SODIUM CHLORIDE 2000 MILLILITER(S): 9 INJECTION INTRAMUSCULAR; INTRAVENOUS; SUBCUTANEOUS at 18:57

## 2018-11-29 RX ADMIN — INSULIN GLARGINE 30 UNIT(S): 100 INJECTION, SOLUTION SUBCUTANEOUS at 23:51

## 2018-11-29 RX ADMIN — SODIUM CHLORIDE 1000 MILLILITER(S): 9 INJECTION INTRAMUSCULAR; INTRAVENOUS; SUBCUTANEOUS at 21:00

## 2018-11-29 RX ADMIN — Medication 15 MILLIGRAM(S): at 22:08

## 2018-11-29 RX ADMIN — Medication 100 MILLIGRAM(S): at 23:54

## 2018-11-29 NOTE — ED PROVIDER NOTE - MEDICAL DECISION MAKING DETAILS
Pt with elevated sugar will try to r/o infections cause of her hyperglycemia and will tx her pain and re-evaluate.

## 2018-11-29 NOTE — ED ADULT NURSE NOTE - OBJECTIVE STATEMENT
Assumed pt care at 1515. Pt discharged s/p fall x2 days ago daughter states "pelvic fracture." At home, visiting nurse/aide checked blood sugar >500, and pt in severe pain lower back, b/l hips. Pt brought to ER for hyperglycemia and pain. Pt is resting in bed, discomfort noted, no acute distress at this time, pt A+Ox3 slightly disoriented to time,  and daughter at bedside. Heart sounds within defined limits, lung sounds are clear b/l, abd is soft and nontender with positive bowel sounds in all four quadrants, skin is warm, dry and appropriate for age and race, pt incontinent and wheelchair bound at home "for multiple falls" as per pt daughter, pt refusing to roll/assess sacral skin at this time due to pain. Pedal pulses palpated bilaterally, sensory intact on b/l legs. pt educated on plan of care, plan of care taught back to RN. proficiency determined from successful pt teach back. will continue to educate pt throughout ED stay.

## 2018-11-29 NOTE — ED ADULT NURSE REASSESSMENT NOTE - NS ED NURSE REASSESS COMMENT FT1
assumed care of patient from ongoing RN, charting as noted, alert and oriented x3, resting comfortably in stretcher. Family at bedside. VSS. IV fluid infusing, patient reports no complaints. awaiting disposition, safety maintained.

## 2018-11-29 NOTE — ED ADULT TRIAGE NOTE - CHIEF COMPLAINT QUOTE
Pt  brought in by ambulance from home for c/o right sided hip pain since yesterday. Pt denies trauma or injury. Pt noted to have elevated blood glucose while at home by aide.  at triage

## 2018-11-29 NOTE — ED ADULT NURSE NOTE - NSIMPLEMENTINTERV_GEN_ALL_ED
Implemented All Fall with Harm Risk Interventions:  [place] to call system. Call bell, personal items and telephone within reach. Instruct patient to call for assistance. Room bathroom lighting operational. Non-slip footwear when patient is off stretcher. Physically safe environment: no spills, clutter or unnecessary equipment. Stretcher in lowest position, wheels locked, appropriate side rails in place. Provide visual cue, wrist band, yellow gown, etc. Monitor gait and stability. Monitor for mental status changes and reorient to person, place, and time. Review medications for side effects contributing to fall risk. Reinforce activity limits and safety measures with patient and family. Provide visual clues: red socks.

## 2018-11-29 NOTE — ED PROVIDER NOTE - OBJECTIVE STATEMENT
89 y/o female presents to the ED c/o hyperglycemia today. According to family, pt had elevated sugar levels today. Aid noted that pt's BS was in the 500's today. No vomiting, abd pain, CP, dysuria, hematuria. Pt was in the ED 2 days ago s/p fall and was diagnosed with a R superior inferior pubic rami fx. Pt is on [place] only for her sugar 30 units a day. No change in her diabetic regimen since February.   Pt is c/o back pain which is unchanged from prior fall

## 2018-11-29 NOTE — ED ADULT NURSE REASSESSMENT NOTE - NS ED NURSE REASSESS COMMENT FT1
Pt lying comfortably in bed at this time, A+Ox3, family at bedside, no acute distress noted. Pt with IV placed, blood work sent as ordered, pt given tylenol for pain as ordered. Pt, pt family, and aide educated that urine sample is needed, pt educated on letting staff know when she has to urinate so urine can be collected. Pt VSS as documented in flowsheet. pt educated on plan of care, pt able to successfully teach back plan of care to RN, RN will continue to reeducate pt during hospital stay.

## 2018-11-29 NOTE — ED PROVIDER NOTE - PROGRESS NOTE DETAILS
Pt's pain controlled now. Sugar also improved. Pt. has no UTI symptoms. Urine culture sent. NO ABX at this time needed. This was discussed with patient and her family. Pt. will need to follow up with her PMD and may need adjustment in her diabetic medication. Pt's night dose of Lantus will be given. Transportation will be set up. Pt's pain controlled now. Sugar also improved. Urine culture sent. Given Pt's age and hx of dementia. Pt. will be tx for Possible UTI(see Urine results). This was discussed with patient and her family. Pt. will need to follow up with her PMD and may need adjustment in her diabetic medication. Pt's night dose of Lantus will be given. Transportation will be set up.

## 2018-11-29 NOTE — ED ADULT NURSE NOTE - INTEGUMENTARY WDL
Color consistent with ethnicity/race, warm, dry intact, resilient. Unable to roll pt d/t pain, pt refusing to roll at this time

## 2018-11-30 VITALS
HEART RATE: 65 BPM | SYSTOLIC BLOOD PRESSURE: 168 MMHG | TEMPERATURE: 98 F | DIASTOLIC BLOOD PRESSURE: 68 MMHG | OXYGEN SATURATION: 98 % | RESPIRATION RATE: 18 BRPM

## 2018-11-30 LAB
CULTURE RESULTS: SIGNIFICANT CHANGE UP
SPECIMEN SOURCE: SIGNIFICANT CHANGE UP

## 2018-11-30 NOTE — ED ADULT NURSE REASSESSMENT NOTE - NS ED NURSE REASSESS COMMENT FT1
Discharge instruction reviewed with patient daughter by Dr Cervantes. [facility] arrived to transport patient home. VSS, patient reports relief from pain medications. Comfort care done. Iv removed. Safety maintained.

## 2019-03-01 ENCOUNTER — OUTPATIENT (OUTPATIENT)
Dept: OUTPATIENT SERVICES | Facility: HOSPITAL | Age: 84
LOS: 1 days | End: 2019-03-01
Payer: MEDICARE

## 2019-03-01 PROCEDURE — G9001: CPT

## 2019-03-04 DIAGNOSIS — Z71.89 OTHER SPECIFIED COUNSELING: ICD-10-CM

## 2019-11-12 NOTE — ED ADULT NURSE NOTE - NS ED PATIENT SAFETY CONCERN
CONSTITUTIONAL: in moderate pain  SKIN: Warm dry, normal skin turgor  HEAD: NCAT  EYES: PERRLA, no scleral icterus, conjunctiva pink  ENT: normal pharynx with no erythema or exudates  NECK: Supple; non tender. Full ROM.  CARD: RRR, no murmurs.  RESP: clear to ausculation b/l. No crackles or wheezing.  ABD: soft, + epigastric tenderness, non-distended, no rebound or guarding. no cva tenderness  EXT: Full ROM, no pedal edema, no calf tenderness  NEURO: normal motor. normal sensory.  PSYCH: Cooperative, appropriate.
No

## 2020-09-28 NOTE — PROGRESS NOTE ADULT - ATTENDING COMMENTS
PT comfortable, no complaints. denies LH, chest pain, palpition  Tele with sinus bradycardia in 40s-50s  Exam notable for rales at bases.     -given preserved LV function and no other indication for BB, in setting of resting bradycardia and LBBB in this frail lady, will hold beta blockers for now  -Will likely need intervention on valve disease if agreeable. Would consider TAVR evaluation, though also with concomittant mitral disease. Will discuss with pt and family goals of care. Would consider surgical evaluation vs palliative approach pending patient's wishes  -continue diuresis PT comfortable, no complaints. denies LH, chest pain, palpitation  Tele with sinus bradycardia in 40s-50s  Exam notable for rales at bases.     -given preserved LV function and no other indication for BB, in setting of resting bradycardia and LBBB in this frail lady, will hold beta blockers for now  -Will likely need intervention on valve disease if agreeable. Would consider TAVR evaluation, though also with concomittant mitral disease. Will discuss with pt and family goals of care. Would consider surgical evaluation vs palliative approach pending patient's wishes  -continue diuresis    Addendum:  Discussed with pt and family. Will proceed with TAVR evaluation. CT surgical evaluation appreciated. Likely not candidate for open heart surgery despite multiple valve disease, but treatment of AV disease likely to still be beneficial with TAVR if possible. Will get MISA to better assess AS and mitral valve disease. SANDY marcos MD No

## 2022-02-17 NOTE — PATIENT PROFILE ADULT. - PRO INTERPRETER NEED 2
English Female Pregnancy Counseling Text: Female patients should also be on two forms of birth control while taking this medication and for one month after their last dose.

## 2023-07-12 NOTE — ED POST DISCHARGE NOTE - REASON FOR FOLLOW-UP
----- Message from Keegan Alvarado Keegan Alvarado MD   Culture Follow-up

## 2023-11-16 NOTE — H&P ADULT - HISTORY OF PRESENT ILLNESS
Subjective     Nadira Fofana is a 83 y.o. female who presents for the following: Skin Check (Patient presents in office today for full body skin exam. /PMHX insignificant./FMHX insignificant./Patient endorses the following concerns since their last visit: none/Signs/symptoms: none./Medication changes include: none/Patient denies further complaints./).     Review of Systems:  No other skin or systemic complaints other than what is documented elsewhere in the note.    The following portions of the chart were reviewed this encounter and updated as appropriate:         Skin Cancer History  No skin cancer on file.      Specialty Problems    None       Objective   Well appearing patient in no apparent distress; mood and affect are within normal limits.    A full examination was performed including scalp, head, eyes, ears, nose, lips, neck, chest, axillae, abdomen, back, buttocks, bilateral upper extremities, bilateral lower extremities, hands, feet, fingers, toes, fingernails, and toenails. All findings within normal limits unless otherwise noted below.    Assessment/Plan   1. Encounter for screening for malignant neoplasm of skin  Scattered benign lesions    - Protective measures, such as avoiding skin exposure to sunlight during peak sun hours (10 AM to 3 PM), wearing protective clothing, and applying high-SPF sunscreen, are essential for reducing exposure to harmful ultraviolet (UV) light.  - Monthly self-examination of the skin is helpful to detect new lesions or changes in existing lesions.  - Discussed signs and symptoms of sun-related skin cancers.   - Make sure your moles are not signs of skin cancer (melanoma). Remember the ABCDEs of melanoma lesions:  A - Asymmetry: One half of the lesion does not mirror the other half.  B - Border: The borders are irregular or vague (indistinct).  C - Color: More than one color may be noted within the mole.  D - Diameter: Size greater than 6 mm (roughly the size of a pencil  eraser) may be concerning.  E - Evolving: Notable changes in the lesion over time are suspicious signs for skin cancer.    Related Procedures  Follow Up In Dermatology - Established Patient    2. Hemangioma of skin  Violaceous/red papule with maroon lagoons     - A cherry hemangioma is a small macule (small, flat, smooth area) or papule (small, solid bump) formed from an overgrowth of tiny blood vessels in the skin. Cherry hemangiomas are characteristically red or purplish in color. They often first appear in middle adulthood and usually increase in number with age. Cherry hemangiomas are noncancerous (benign) and are common in adults.  - Lesions are benign, reassured patient.     3. Seborrheic keratosis  Stuck on verrucous, tan-brown papules and plaques.      Although Seborrheic Keratoses can be troublesome and unsightly, they are entirely benign.  Removal of Seborrheic Keratoses is considered a cosmetic procedure. Removal is typically performed using liquid nitrogen cryotherapy.  Treatment of current lesions does not prevent the development of new Seborrheic Keratoses in the future.    4. Lentigo  Scattered tan macules in sun-exposed areas.    A solar lentigo (plural, solar lentigines), sometimes called an age spot or liver spot, is a brown macule (small, flat, smooth area of skin) caused by chronic sun or artificial ultraviolet (UV) light exposure. There may be just one lentigo or there may be multiple. This type of lentigo is different from lentigo simplex (discussed separately) because it is caused by exposure to UV light. Solar lentigines are benign, but they do indicate excessive sun exposure, a risk factor for the development of skin cancer.  Lesions are benign, no treatment needed.     5. Rosacea  Head - Anterior (Face)  Mid face erythema with telangiectasias and scattered inflammatory papules.    -Discussed nature of this chronic condition  -Recommend gentle skin care habits including gentle cleansers,  non-comedogenic physical/mineral based sunscreen daily. Avoid exfoliation, wind and extreme temperatures when possible.           INCOMPLETE NOTE  TRAUMA CONSULT NOTE  89y Female presents to ED s/p witnessed fall during transfer from wheelchair to chair because she did not want to wait for assistance from aide. +HS, denies LOC, witnessed by aide. Patient complaining only of R hip pain. Denies dizziness or orthostasis. Denies hematuria or BRBPR. Patient denies fevers/chills, denies lightheadedness, denies SOB/chest pain, denies nausea/vomiting, denies constipation/diarrhea.     A airway intact, C collar placed, speaking full sentences  B equal breath sounds bilaterally  C radial/DP/femoral pulses intact bilaterally   D GCS15 [id number], moving all fours, no focal deficits, pupils R 3mm reactive/L 3mm reactive  E patient fully exposed, no gross deformities or bleeding, provided warm blankets    ROS:   General: denies fatigue, unintended weight loss or night sweats  Neuro: denies focal weakness, numbness or tingling  CV: denies chest pain, chest pressure or palpitations  Resp: denies shortness of breath, pleuritic pain, cough, or wheezing  GI: denies changes in bowel movement, melena or BRBPR  : denies dysuria, hematuria, urinary frequency or urinary urgency  MSK: denies myalgias     PAST MEDICAL & SURGICAL HISTORY:  Overactive bladder  Dementia  High cholesterol  HTN (hypertension)  DM (diabetes mellitus)  MI  CHF previously on hospice but was stable enough she was discharged from hospice    FAMILY HISTORY:  No pertinent family history in first degree relatives    [x] Family history not pertinent as reviewed with the patient and family    SOCIAL HISTORY:  lives at home with aide    ALLERGIES: No Known Allergies    HOME MEDICATIONS: reviewed, only blood thinner asa81, +betablocker    --------------------------------------------------------------------------------------------    PHYSICAL EXAM:   General: NAD, Lying in bed comfortably  Neuro: A+Ox3  HEENT: NC/AT, EOMI  Neck: Soft, supple  Cardio: RRR, nml S1/S2  Resp: Good effort, CTA b/l  Thorax: No chest wall tenderness  Breast: No lesions/masses, no drainage  GI/Abd: Soft, NT/ND, no rebound/guarding, no masses palpated  Vascular: All 4 extremities warm.  Skin: Intact, no breakdown  Lymphatic/Nodes: No palpable lymphadenopathy  Pelvis: stable, +R hip ttp  Musculoskeletal: All 4 extremities moving spontaneously, no limitations, no spinal tenderness or stepoffs  --------------------------------------------------------------------------------------------    LABS                 9.1    12.4   )----------(  246       ( 27 Nov 2018 12:52 )               28.8      136    |  97     |  16.0   ----------------------------<  258        ( 27 Nov 2018 12:52 )  4.0     |  25.0   |  0.43     Ca    9.1        ( 27 Nov 2018 12:52 )    TPro  7.5    /  Alb  3.8    /  TBili  0.4    /  DBili  x      /  AST  20     /  ALT  11     /  AlkPhos  150    ( 27 Nov 2018 12:52 )    LIVER FUNCTIONS - ( 27 Nov 2018 12:52 )  Alb: 3.8 g/dL / Pro: 7.5 g/dL / ALK PHOS: 150 U/L / ALT: 11 U/L / AST: 20 U/L / GGT: x           PT/INR -  14.9 sec / 1.29 ratio   ( 27 Nov 2018 12:52 )    PTT -  35.1 sec   ( 27 Nov 2018 12:52 )    --------------------------------------------------------------------------------------------  IMAGING  CT head: neg  CT C-spine: neg  CT pelvis noncontrast:  acute mildly displaced fractures through the mid portions of both the right superior and inferior pubic rami with minimal displacement.    ASSESSMENT: Patient is a 89y old female s/p mechanical fall from wheelchair with R superior and inferior rami fractures    PLAN:    - f/u SW for clearance for safe dispo  - Plan to be discussed with Attending, Dr. Lentz  INCOMPLETE NOTE TRAUMA CONSULT NOTE    89y Female presents to ED s/p witnessed fall during transfer from wheelchair to chair because she did not want to wait for assistance from aide. +HS, denies LOC, witnessed by aide. Patient complaining only of R hip pain. Denies dizziness or orthostasis. Denies hematuria or BRBPR. Patient denies fevers/chills, denies lightheadedness, denies SOB/chest pain, denies nausea/vomiting, denies constipation/diarrhea.     A airway intact, C collar placed, speaking full sentences  B equal breath sounds bilaterally  C radial/DP/femoral pulses intact bilaterally   D GCS15 E4V5M6, moving all fours, no focal deficits, pupils R 3mm reactive/L 3mm reactive  E patient fully exposed, no gross deformities or bleeding, provided warm blankets    ROS:   General: denies fatigue, unintended weight loss or night sweats  Neuro: denies focal weakness, numbness or tingling  CV: denies chest pain, chest pressure or palpitations  Resp: denies shortness of breath, pleuritic pain, cough, or wheezing  GI: denies changes in bowel movement, melena or BRBPR  : denies dysuria, hematuria, urinary frequency or urinary urgency  MSK: denies myalgias     PAST MEDICAL & SURGICAL HISTORY:  Overactive bladder  Dementia  High cholesterol  HTN (hypertension)  DM (diabetes mellitus)  MI  CHF previously on hospice but was stable enough she was discharged from hospice    FAMILY HISTORY:  No pertinent family history in first degree relatives    [x] Family history not pertinent as reviewed with the patient and family    SOCIAL HISTORY:  lives at home with aide    ALLERGIES: No Known Allergies    HOME MEDICATIONS: reviewed, only blood thinner asa81, +betablocker    --------------------------------------------------------------------------------------------    PHYSICAL EXAM:   General: NAD, Lying in bed comfortably  Neuro: A+Ox3  HEENT: NC/AT, EOMI  Neck: Soft, supple  Cardio: RRR, nml S1/S2  Resp: Good effort, CTA b/l  Thorax: No chest wall tenderness  Breast: No lesions/masses, no drainage  GI/Abd: Soft, NT/ND, no rebound/guarding, no masses palpated  Vascular: All 4 extremities warm.  Skin: Intact, no breakdown  Lymphatic/Nodes: No palpable lymphadenopathy  Pelvis: stable, +R hip ttp  Musculoskeletal: All 4 extremities moving spontaneously, no limitations, no spinal tenderness or stepoffs  --------------------------------------------------------------------------------------------    LABS                 9.1    12.4   )----------(  246       ( 27 Nov 2018 12:52 )               28.8      136    |  97     |  16.0   ----------------------------<  258        ( 27 Nov 2018 12:52 )  4.0     |  25.0   |  0.43     Ca    9.1        ( 27 Nov 2018 12:52 )    TPro  7.5    /  Alb  3.8    /  TBili  0.4    /  DBili  x      /  AST  20     /  ALT  11     /  AlkPhos  150    ( 27 Nov 2018 12:52 )    LIVER FUNCTIONS - ( 27 Nov 2018 12:52 )  Alb: 3.8 g/dL / Pro: 7.5 g/dL / ALK PHOS: 150 U/L / ALT: 11 U/L / AST: 20 U/L / GGT: x           PT/INR -  14.9 sec / 1.29 ratio   ( 27 Nov 2018 12:52 )    PTT -  35.1 sec   ( 27 Nov 2018 12:52 )    --------------------------------------------------------------------------------------------  IMAGING  CT head: neg  CT C-spine: neg  CT pelvis noncontrast:  acute mildly displaced fractures through the mid portions of both the right superior and inferior pubic rami with minimal displacement.    ASSESSMENT: Patient is a 89y old female s/p mechanical fall from wheelchair with R superior and inferior rami fractures    PLAN:    - No acute trauma surgical intervention indicated  - Disposition per ED  - Patient seen and examined with attending  - Plan discussed with Attending, Dr. Bautista

## 2023-11-19 NOTE — GOALS OF CARE CONVERSATION - PERSONAL ADVANCE DIRECTIVE - TREATMENT GUIDELINE COMMENT
Md was able successfully reduce the hip under anesthesia. No incision was made.    Prescriptions given to patient and ; dressing placed on iv site after taking catheter out; informed she could remove it in an hour; informed patient to leave immobilizer on till seen by physician;    Medical Management    F/U with Cardiologist in two weeks 97

## 2024-03-13 NOTE — ED PROVIDER NOTE - NEUROLOGICAL, MLM
[de-identified] : ANDREZ HENRY is a 40 year old female with no significant PMH who presented to [facility] on 2/15/24 with intermittent left sided weakness. Neurology consulted, work-up without evidence of strokes, likely polyarthralgia. Started on ASA 81. 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 Denies known family history of cerebral aneurysms or aneurysm rupture. Not a smoker. 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   Today, patient presents for post-hospital neurosurgical follow up after this incidental finding. Now presenting with lingering migraines. She has follow up with neurology outside [facility].  Alert and oriented, no focal deficits, no motor or sensory deficits.

## 2024-09-05 NOTE — ED ADULT TRIAGE NOTE - WEIGHT IN KG
Continue Trelegy Ellipta one puff daily, rinse/gargle after use.  Continue Albuterol or Duonebs as needed.  Here is the link for the Trelegy savings card, sign up and then present this to your pharmacist:    [email or web address]  Follow-up in 3 months.    Sirena Pratt, CNP  Pulmonary Medicine  [facility]  [phone]     70.3

## 2025-03-23 NOTE — OCCUPATIONAL THERAPY INITIAL EVALUATION ADULT - PLANNED THERAPY INTERVENTIONS, OT EVAL
Heated Carrier negative... transfer training/balance training/ADL retraining/bed mobility training/toilet